# Patient Record
Sex: FEMALE | Race: WHITE | Employment: PART TIME | URBAN - METROPOLITAN AREA
[De-identification: names, ages, dates, MRNs, and addresses within clinical notes are randomized per-mention and may not be internally consistent; named-entity substitution may affect disease eponyms.]

---

## 2017-02-28 DIAGNOSIS — F41.9 ANXIETY: ICD-10-CM

## 2017-02-28 RX ORDER — ALPRAZOLAM 0.5 MG/1
TABLET ORAL
Qty: 30 TABLET | Refills: 1 | OUTPATIENT
Start: 2017-02-28

## 2017-03-06 ENCOUNTER — OFFICE VISIT (OUTPATIENT)
Dept: FAMILY MEDICINE CLINIC | Age: 27
End: 2017-03-06

## 2017-03-06 VITALS
BODY MASS INDEX: 24.45 KG/M2 | RESPIRATION RATE: 16 BRPM | HEART RATE: 88 BPM | DIASTOLIC BLOOD PRESSURE: 64 MMHG | HEIGHT: 63 IN | SYSTOLIC BLOOD PRESSURE: 120 MMHG | WEIGHT: 138 LBS | TEMPERATURE: 98.4 F

## 2017-03-06 DIAGNOSIS — F41.9 ANXIETY: ICD-10-CM

## 2017-03-06 DIAGNOSIS — F51.01 PRIMARY INSOMNIA: ICD-10-CM

## 2017-03-06 DIAGNOSIS — J45.20 MILD INTERMITTENT ASTHMA WITHOUT COMPLICATION: ICD-10-CM

## 2017-03-06 DIAGNOSIS — H91.93 HEARING LOSS, BILATERAL: Primary | ICD-10-CM

## 2017-03-06 DIAGNOSIS — K21.00 GASTROESOPHAGEAL REFLUX DISEASE WITH ESOPHAGITIS: ICD-10-CM

## 2017-03-06 PROCEDURE — 99214 OFFICE O/P EST MOD 30 MIN: CPT | Performed by: FAMILY MEDICINE

## 2017-03-06 RX ORDER — OMEPRAZOLE 20 MG/1
CAPSULE, DELAYED RELEASE ORAL
Qty: 30 CAPSULE | Refills: 5 | Status: SHIPPED | OUTPATIENT
Start: 2017-03-06 | End: 2017-09-18 | Stop reason: SDUPTHER

## 2017-03-06 RX ORDER — ALPRAZOLAM 0.5 MG/1
TABLET ORAL
Qty: 30 TABLET | Refills: 2 | Status: SHIPPED | OUTPATIENT
Start: 2017-03-06 | End: 2017-06-02 | Stop reason: SDUPTHER

## 2017-03-06 RX ORDER — ZOLPIDEM TARTRATE 10 MG/1
TABLET ORAL
Qty: 30 TABLET | Refills: 3 | Status: SHIPPED | OUTPATIENT
Start: 2017-03-06 | End: 2017-06-02 | Stop reason: SDUPTHER

## 2017-03-06 RX ORDER — ALBUTEROL SULFATE 90 UG/1
2 AEROSOL, METERED RESPIRATORY (INHALATION) EVERY 6 HOURS PRN
Qty: 1 INHALER | Refills: 5 | Status: SHIPPED | OUTPATIENT
Start: 2017-03-06 | End: 2017-09-18 | Stop reason: SDUPTHER

## 2017-03-06 ASSESSMENT — ENCOUNTER SYMPTOMS
ABDOMINAL PAIN: 0
CONSTIPATION: 0
EYES NEGATIVE: 1
COUGH: 0
SHORTNESS OF BREATH: 0
DIARRHEA: 0
VOMITING: 0
NAUSEA: 0

## 2017-06-02 ENCOUNTER — OFFICE VISIT (OUTPATIENT)
Dept: FAMILY MEDICINE CLINIC | Age: 27
End: 2017-06-02

## 2017-06-02 VITALS
BODY MASS INDEX: 22.18 KG/M2 | TEMPERATURE: 97.5 F | DIASTOLIC BLOOD PRESSURE: 78 MMHG | SYSTOLIC BLOOD PRESSURE: 112 MMHG | RESPIRATION RATE: 16 BRPM | WEIGHT: 125.2 LBS | HEART RATE: 100 BPM | HEIGHT: 63 IN

## 2017-06-02 DIAGNOSIS — F51.01 PRIMARY INSOMNIA: ICD-10-CM

## 2017-06-02 DIAGNOSIS — F41.9 ANXIETY: Primary | ICD-10-CM

## 2017-06-02 DIAGNOSIS — M79.7 FIBROMYALGIA: ICD-10-CM

## 2017-06-02 PROCEDURE — 99213 OFFICE O/P EST LOW 20 MIN: CPT | Performed by: FAMILY MEDICINE

## 2017-06-02 RX ORDER — ALPRAZOLAM 0.5 MG/1
TABLET ORAL
Qty: 30 TABLET | Refills: 3 | Status: SHIPPED | OUTPATIENT
Start: 2017-06-02 | End: 2017-09-18 | Stop reason: SDUPTHER

## 2017-06-02 RX ORDER — ZOLPIDEM TARTRATE 10 MG/1
TABLET ORAL
Qty: 30 TABLET | Refills: 3 | Status: SHIPPED | OUTPATIENT
Start: 2017-06-02 | End: 2017-09-18 | Stop reason: SDUPTHER

## 2017-06-02 ASSESSMENT — ENCOUNTER SYMPTOMS
CONSTIPATION: 0
DIARRHEA: 0
EYES NEGATIVE: 1
NAUSEA: 0
COUGH: 0
SHORTNESS OF BREATH: 0
ABDOMINAL PAIN: 0

## 2017-06-06 ENCOUNTER — OFFICE VISIT (OUTPATIENT)
Dept: FAMILY MEDICINE CLINIC | Age: 27
End: 2017-06-06

## 2017-06-06 VITALS
SYSTOLIC BLOOD PRESSURE: 122 MMHG | TEMPERATURE: 98.7 F | HEIGHT: 63 IN | HEART RATE: 92 BPM | RESPIRATION RATE: 16 BRPM | BODY MASS INDEX: 22.08 KG/M2 | DIASTOLIC BLOOD PRESSURE: 74 MMHG | WEIGHT: 124.6 LBS

## 2017-06-06 DIAGNOSIS — K08.89 TOOTH PAIN: Primary | ICD-10-CM

## 2017-06-06 DIAGNOSIS — K04.7 TOOTH INFECTION: ICD-10-CM

## 2017-06-06 PROCEDURE — 99213 OFFICE O/P EST LOW 20 MIN: CPT | Performed by: FAMILY MEDICINE

## 2017-06-06 RX ORDER — HYDROCODONE BITARTRATE AND ACETAMINOPHEN 5; 325 MG/1; MG/1
1 TABLET ORAL EVERY 6 HOURS PRN
Qty: 12 TABLET | Refills: 0 | Status: SHIPPED | OUTPATIENT
Start: 2017-06-06 | End: 2017-06-13

## 2017-06-06 RX ORDER — AMOXICILLIN 875 MG/1
875 TABLET, COATED ORAL 2 TIMES DAILY
Qty: 20 TABLET | Refills: 0 | Status: SHIPPED | OUTPATIENT
Start: 2017-06-06 | End: 2017-09-18 | Stop reason: SDUPTHER

## 2017-06-11 ASSESSMENT — ENCOUNTER SYMPTOMS
RHINORRHEA: 0
SHORTNESS OF BREATH: 0

## 2017-09-18 ENCOUNTER — OFFICE VISIT (OUTPATIENT)
Dept: FAMILY MEDICINE CLINIC | Age: 27
End: 2017-09-18

## 2017-09-18 VITALS
BODY MASS INDEX: 21.26 KG/M2 | HEART RATE: 72 BPM | DIASTOLIC BLOOD PRESSURE: 76 MMHG | HEIGHT: 63 IN | WEIGHT: 120 LBS | RESPIRATION RATE: 16 BRPM | TEMPERATURE: 96.6 F | SYSTOLIC BLOOD PRESSURE: 112 MMHG

## 2017-09-18 DIAGNOSIS — J45.20 MILD INTERMITTENT ASTHMA WITHOUT COMPLICATION: ICD-10-CM

## 2017-09-18 DIAGNOSIS — M79.7 FIBROMYALGIA: ICD-10-CM

## 2017-09-18 DIAGNOSIS — K21.00 GASTROESOPHAGEAL REFLUX DISEASE WITH ESOPHAGITIS: ICD-10-CM

## 2017-09-18 DIAGNOSIS — K04.7 TOOTH INFECTION: ICD-10-CM

## 2017-09-18 DIAGNOSIS — K08.89 TOOTH PAIN: ICD-10-CM

## 2017-09-18 DIAGNOSIS — F51.01 PRIMARY INSOMNIA: ICD-10-CM

## 2017-09-18 DIAGNOSIS — F41.9 ANXIETY: Primary | ICD-10-CM

## 2017-09-18 PROCEDURE — 99213 OFFICE O/P EST LOW 20 MIN: CPT | Performed by: FAMILY MEDICINE

## 2017-09-18 RX ORDER — ALBUTEROL SULFATE 90 UG/1
2 AEROSOL, METERED RESPIRATORY (INHALATION) EVERY 6 HOURS PRN
Qty: 1 INHALER | Refills: 5 | Status: SHIPPED | OUTPATIENT
Start: 2017-09-18 | End: 2018-07-20

## 2017-09-18 RX ORDER — ZOLPIDEM TARTRATE 10 MG/1
TABLET ORAL
Qty: 30 TABLET | Refills: 3 | Status: SHIPPED | OUTPATIENT
Start: 2017-09-18 | End: 2018-07-20

## 2017-09-18 RX ORDER — AMOXICILLIN 875 MG/1
875 TABLET, COATED ORAL 2 TIMES DAILY
Qty: 20 TABLET | Refills: 0 | Status: SHIPPED | OUTPATIENT
Start: 2017-09-18 | End: 2017-09-28

## 2017-09-18 RX ORDER — OMEPRAZOLE 20 MG/1
CAPSULE, DELAYED RELEASE ORAL
Qty: 30 CAPSULE | Refills: 5 | Status: SHIPPED | OUTPATIENT
Start: 2017-09-18 | End: 2018-07-20

## 2017-09-18 RX ORDER — ALPRAZOLAM 0.5 MG/1
TABLET ORAL
Qty: 34 TABLET | Refills: 3 | Status: SHIPPED | OUTPATIENT
Start: 2017-09-18 | End: 2018-07-20

## 2017-09-18 ASSESSMENT — ENCOUNTER SYMPTOMS
COUGH: 0
CONSTIPATION: 0
DIARRHEA: 0
TROUBLE SWALLOWING: 0
SHORTNESS OF BREATH: 0
NAUSEA: 0
ABDOMINAL PAIN: 0
EYES NEGATIVE: 1

## 2018-07-20 ENCOUNTER — HOSPITAL ENCOUNTER (EMERGENCY)
Age: 28
Discharge: HOME | End: 2018-07-20
Attending: EMERGENCY MEDICINE

## 2018-07-20 VITALS
BODY MASS INDEX: 21.16 KG/M2 | HEIGHT: 62 IN | TEMPERATURE: 98.5 F | HEART RATE: 76 BPM | WEIGHT: 115 LBS | SYSTOLIC BLOOD PRESSURE: 122 MMHG | RESPIRATION RATE: 18 BRPM | DIASTOLIC BLOOD PRESSURE: 80 MMHG | OXYGEN SATURATION: 99 %

## 2018-07-20 DIAGNOSIS — K08.89 PAIN, DENTAL: ICD-10-CM

## 2018-07-20 DIAGNOSIS — H92.03 OTALGIA OF BOTH EARS: ICD-10-CM

## 2018-07-20 DIAGNOSIS — R06.2 WHEEZING: Primary | ICD-10-CM

## 2018-07-20 PROCEDURE — 6370000000 HC RX 637 (ALT 250 FOR IP): Performed by: EMERGENCY MEDICINE

## 2018-07-20 PROCEDURE — 6360000002 HC RX W HCPCS: Performed by: EMERGENCY MEDICINE

## 2018-07-20 PROCEDURE — 94640 AIRWAY INHALATION TREATMENT: CPT

## 2018-07-20 PROCEDURE — 99283 EMERGENCY DEPT VISIT LOW MDM: CPT

## 2018-07-20 RX ORDER — HYDROCODONE BITARTRATE AND ACETAMINOPHEN 5; 325 MG/1; MG/1
1 TABLET ORAL EVERY 6 HOURS PRN
Qty: 10 TABLET | Refills: 0 | Status: SHIPPED | OUTPATIENT
Start: 2018-07-20 | End: 2018-07-23

## 2018-07-20 RX ORDER — IBUPROFEN 200 MG
800 TABLET ORAL EVERY 6 HOURS PRN
COMMUNITY
End: 2019-09-10

## 2018-07-20 RX ORDER — AZITHROMYCIN 250 MG/1
TABLET, FILM COATED ORAL
Qty: 6 TABLET | Refills: 0 | Status: SHIPPED | OUTPATIENT
Start: 2018-07-20 | End: 2018-07-30

## 2018-07-20 RX ORDER — IPRATROPIUM BROMIDE AND ALBUTEROL SULFATE 2.5; .5 MG/3ML; MG/3ML
1 SOLUTION RESPIRATORY (INHALATION)
Status: DISCONTINUED | OUTPATIENT
Start: 2018-07-20 | End: 2018-07-20 | Stop reason: HOSPADM

## 2018-07-20 RX ORDER — DEXAMETHASONE 4 MG/1
8 TABLET ORAL ONCE
Status: COMPLETED | OUTPATIENT
Start: 2018-07-20 | End: 2018-07-20

## 2018-07-20 RX ADMIN — IPRATROPIUM BROMIDE AND ALBUTEROL SULFATE 1 AMPULE: .5; 3 SOLUTION RESPIRATORY (INHALATION) at 13:02

## 2018-07-20 RX ADMIN — DEXAMETHASONE 8 MG: 4 TABLET ORAL at 13:00

## 2018-07-20 ASSESSMENT — ENCOUNTER SYMPTOMS
SHORTNESS OF BREATH: 0
SORE THROAT: 0
BLOOD IN STOOL: 0
STRIDOR: 0
EYE DISCHARGE: 0
ABDOMINAL PAIN: 0
TROUBLE SWALLOWING: 0
EYE REDNESS: 0
DIARRHEA: 0
BACK PAIN: 0
FACIAL SWELLING: 1
CHOKING: 0
SINUS PRESSURE: 0
CHEST TIGHTNESS: 0
CONSTIPATION: 0
COUGH: 1
WHEEZING: 1
VOICE CHANGE: 0
EYE PAIN: 0
VOMITING: 0

## 2018-07-20 ASSESSMENT — PAIN DESCRIPTION - ORIENTATION: ORIENTATION: RIGHT

## 2018-07-20 ASSESSMENT — PAIN SCALES - GENERAL: PAINLEVEL_OUTOF10: 9

## 2018-07-20 ASSESSMENT — PAIN DESCRIPTION - PROGRESSION: CLINICAL_PROGRESSION: NOT CHANGED

## 2018-07-20 ASSESSMENT — PAIN DESCRIPTION - FREQUENCY: FREQUENCY: CONTINUOUS

## 2018-07-20 ASSESSMENT — PAIN DESCRIPTION - LOCATION: LOCATION: EAR;MOUTH

## 2018-07-20 ASSESSMENT — PAIN DESCRIPTION - ONSET: ONSET: SUDDEN

## 2018-07-20 ASSESSMENT — PAIN DESCRIPTION - PAIN TYPE: TYPE: ACUTE PAIN

## 2018-07-20 ASSESSMENT — PAIN DESCRIPTION - DESCRIPTORS: DESCRIPTORS: STABBING;THROBBING

## 2018-07-20 NOTE — ED PROVIDER NOTES
[address] [place] [address] ED  eMERGENCY dEPARTMENT eNCOUnter      Pt Name: Ava Mas  MRN: [id number]  [place] 1990  Date of evaluation: 7/20/2018  Provider: Laron Dominguez MD    [address] [place] [address]       Chief Complaint   Patient presents with    Dental Pain     Right side of mouth/jaw    Otalgia     Stabbing pain to right ear         HISTORY OF PRESENT ILLNESS   (Location/Symptom, Timing/Onset, Context/Setting, Quality, Duration, Modifying Factors, Severity)  Note limiting factors. Ava Mas is a 28 y.o. female who presents to the emergency department Patient with a history of polymyalgia chronic pain history of anxiety and depression seen a primary care doctor's 3 months ago for dental issue but never seen a dentist patient come to this emergency because of the dental pain for the last few days time right side of the mouth as well as patient has a right ear pain no drainage no fevers chills patient has a positive for cold cough congestion patient is a smoker    HPI    Nursing Notes were reviewed. REVIEW OF SYSTEMS    (2-9 systems for level 4, 10 or more for level 5)     Review of Systems   Constitutional: Positive for chills. Negative for activity change and fever. HENT: Positive for congestion, ear pain, facial swelling and postnasal drip. Negative for drooling, mouth sores, nosebleeds, sinus pressure, sore throat, trouble swallowing and voice change. Eyes: Negative for pain, discharge, redness and visual disturbance. Respiratory: Positive for cough and wheezing. Negative for choking, chest tightness, shortness of breath and stridor. Cardiovascular: Negative for chest pain, palpitations and leg swelling. Gastrointestinal: Negative for abdominal pain, blood in stool, constipation, diarrhea and vomiting. Endocrine: Negative for cold intolerance, polyphagia and polyuria. Genitourinary: Negative for dysuria, flank pain, frequency, genital sores and urgency.    Musculoskeletal: Negative for She is oriented to person, place, and time. She appears well-developed. Active alert cooperative pleasant sounds slightly congested nasally   HENT:   Head: Normocephalic and atraumatic. Both tms  are normal no drainage from the ears, attention given to the mother patient is a poor dental hygiene and dental decay dental caries noted patient has a gingivitis minimal erythema the premolar tooth with right-sided patient has no facial cellulitis   Eyes: Conjunctivae are normal. Right eye exhibits no discharge. Left eye exhibits no discharge. Neck: Normal range of motion. Neck supple. No JVD present. No tracheal deviation present. No thyromegaly present. Cardiovascular: Normal rate, regular rhythm and normal heart sounds. Exam reveals no gallop and no friction rub. No murmur heard. Pulmonary/Chest: No respiratory distress. She has wheezes. Abdominal: Soft. Bowel sounds are normal. She exhibits no mass. There is no rebound. Musculoskeletal: Normal range of motion. She exhibits no edema or tenderness. Lymphadenopathy:     She has no cervical adenopathy. Neurological: She is alert and oriented to person, place, and time. No cranial nerve deficit. She exhibits normal muscle tone. Skin: Skin is warm. No rash noted. No erythema.    Psychiatric: Her behavior is normal. Thought content normal.       DIAGNOSTIC RESULTS     EKG: All EKG's are interpreted by the Emergency Department Physician who either signs or Co-signs this chart in the absence of a cardiologist.        RADIOLOGY:   Non-plain film images such as CT, Ultrasound and MRI are read by the radiologist. Plain radiographic images are visualized and preliminarily interpreted by the emergency physician with the below findings:        Interpretation per the Radiologist below, if available at the time of this note:    No orders to display         ED BEDSIDE ULTRASOUND:   Performed by ED Physician - none    LABS:  Labs Reviewed - No data to display    All

## 2018-10-02 ENCOUNTER — HOSPITAL ENCOUNTER (EMERGENCY)
Age: 28
Discharge: HOME | End: 2018-10-02
Attending: INTERNAL MEDICINE

## 2018-10-02 ENCOUNTER — APPOINTMENT (OUTPATIENT)
Dept: GENERAL RADIOLOGY | Age: 28
End: 2018-10-02

## 2018-10-02 VITALS
DIASTOLIC BLOOD PRESSURE: 71 MMHG | HEART RATE: 78 BPM | OXYGEN SATURATION: 97 % | HEIGHT: 63 IN | WEIGHT: 115 LBS | TEMPERATURE: 99 F | RESPIRATION RATE: 16 BRPM | BODY MASS INDEX: 20.38 KG/M2 | SYSTOLIC BLOOD PRESSURE: 105 MMHG

## 2018-10-02 DIAGNOSIS — S60.221A CONTUSION OF RIGHT HAND, INITIAL ENCOUNTER: Primary | ICD-10-CM

## 2018-10-02 PROCEDURE — 6370000000 HC RX 637 (ALT 250 FOR IP): Performed by: NURSE PRACTITIONER

## 2018-10-02 PROCEDURE — 73130 X-RAY EXAM OF HAND: CPT

## 2018-10-02 PROCEDURE — 29125 APPL SHORT ARM SPLINT STATIC: CPT

## 2018-10-02 PROCEDURE — 99283 EMERGENCY DEPT VISIT LOW MDM: CPT

## 2018-10-02 RX ORDER — IBUPROFEN 600 MG/1
600 TABLET ORAL ONCE
Status: COMPLETED | OUTPATIENT
Start: 2018-10-02 | End: 2018-10-02

## 2018-10-02 RX ORDER — IBUPROFEN 600 MG/1
600 TABLET ORAL EVERY 6 HOURS PRN
Qty: 20 TABLET | Refills: 0 | Status: SHIPPED | OUTPATIENT
Start: 2018-10-02 | End: 2019-09-10

## 2018-10-02 RX ADMIN — IBUPROFEN 600 MG: 600 TABLET ORAL at 21:11

## 2018-10-02 ASSESSMENT — PAIN DESCRIPTION - LOCATION
LOCATION: HAND
LOCATION: HAND

## 2018-10-02 ASSESSMENT — ENCOUNTER SYMPTOMS
DIARRHEA: 0
VOICE CHANGE: 0
BACK PAIN: 0
COUGH: 0
TROUBLE SWALLOWING: 0
COLOR CHANGE: 0
VOMITING: 0
NAUSEA: 0
SHORTNESS OF BREATH: 0
CONSTIPATION: 0
SORE THROAT: 0
ABDOMINAL PAIN: 0

## 2018-10-02 ASSESSMENT — PAIN DESCRIPTION - DESCRIPTORS: DESCRIPTORS: SHARP

## 2018-10-02 ASSESSMENT — PAIN SCALES - GENERAL
PAINLEVEL_OUTOF10: 5
PAINLEVEL_OUTOF10: 8
PAINLEVEL_OUTOF10: 6

## 2018-10-02 ASSESSMENT — PAIN DESCRIPTION - PROGRESSION: CLINICAL_PROGRESSION: NOT CHANGED

## 2018-10-02 ASSESSMENT — PAIN DESCRIPTION - PAIN TYPE
TYPE: ACUTE PAIN
TYPE: ACUTE PAIN

## 2018-10-02 ASSESSMENT — PAIN DESCRIPTION - ONSET
ONSET: ON-GOING
ONSET: SUDDEN

## 2018-10-02 ASSESSMENT — PAIN DESCRIPTION - ORIENTATION
ORIENTATION: RIGHT
ORIENTATION: RIGHT

## 2018-10-02 ASSESSMENT — PAIN DESCRIPTION - FREQUENCY
FREQUENCY: CONTINUOUS
FREQUENCY: CONTINUOUS

## 2018-10-02 ASSESSMENT — PAIN - FUNCTIONAL ASSESSMENT: PAIN_FUNCTIONAL_ASSESSMENT: 0-10

## 2018-10-03 NOTE — ED NOTES
Pt discharged to home via amb accompanied by  with all documented belongings. Pt was stable at time of discharge with no questions or concerns. All prescriptions were sent with patient.  Electronically signed by Edin Krishnamurthy RN on 10/2/2018 at 9:17 PM        Edin Krishnamurthy, [address] [place] [address]  10/02/18 5339

## 2018-10-03 NOTE — ED PROVIDER NOTES
negative. Except as noted above the remainder of the review of systems was reviewed and negative. PAST MEDICAL HISTORY     Past Medical History:   Diagnosis Date    Anxiety     Asthma        SURGICAL HISTORY     History reviewed. No pertinent surgical history. CURRENT MEDICATIONS       Discharge Medication List as of 10/2/2018  9:06 PM      CONTINUE these medications which have NOT CHANGED    Details   !! ibuprofen (ADVIL;MOTRIN) 200 MG tablet Take 800 mg by mouth every 6 hours as needed for PainHistorical Med      loratadine-pseudoephedrine (CLARITIN-D 12HR) 5-120 MG per extended release tablet Take 1 tablet by mouth 2 times daily, Disp-20 tablet, R-0Print       !! - Potential duplicate medications found. Please discuss with provider. ALLERGIES     Zoloft    FAMILY HISTORY       Family History   Problem Relation Age of Onset    Asthma Mother     Heart Disease Father     Zahraa Umana / Stillbirths Sister     High Blood Pressure Sister     High Cholesterol Sister           SOCIAL HISTORY       Social History     Social History    Marital status: Single     Spouse name: N/A    Number of children: 0    Years of education: N/A     Occupational History     Other     Social History Main Topics    Smoking status: Former Smoker     Packs/day: 1.00     Years: 15.00     Types: Cigarettes     Start date: 1/1/2001     Quit date: 1/20/2016    Smokeless tobacco: Never Used    Alcohol use No    Drug use: No    Sexual activity: Not Asked     Other Topics Concern    None     Social History Narrative    None       SCREENINGS           PHYSICAL EXAM    (up to 7 for level 4, 8 or more for level 5)     ED Triage Vitals [10/02/18 1838]   BP Temp Temp Source Pulse Resp SpO2 Height Weight   (!) 116/55 98.4 °F (36.9 °C) Oral 80 16 99 % 5' 3\" (1.6 m) 115 lb (52.2 kg)       Physical Exam   Constitutional: She is oriented to person, place, and time. She appears well-developed and well-nourished.  No distress. HENT:   Head: Normocephalic and atraumatic. Eyes: Conjunctivae are normal. Right eye exhibits no discharge. Left eye exhibits no discharge. Neck: Normal range of motion. Neck supple. Cardiovascular: Normal rate and regular rhythm. Pulmonary/Chest: Effort normal and breath sounds normal.   Abdominal: Soft. Bowel sounds are normal.   Musculoskeletal: Normal range of motion. Hands:  Neurological: She is alert and oriented to person, place, and time. Skin: Skin is warm and dry. Psychiatric: She has a normal mood and affect. Nursing note and vitals reviewed. DIAGNOSTIC RESULTS     EKG: All EKG's are interpreted by the Emergency Department Physician who either signs or Co-signs this chart in the absence of a cardiologist.      RADIOLOGY:   Non-plain film images such as CT, Ultrasound and MRI are read by the radiologist. Plain radiographic images are visualized and preliminarily interpreted by the emergency physician with the below findings:    Negative for acute fracture or dislocation. Interpretation per the Radiologist below (if available at the time of note entry):    XR HAND RIGHT (MIN 3 VIEWS)    (Results Pending)       LABS:  Labs Reviewed - No data to display    All other labs were within normal range or not returned as of this dictation. EMERGENCY DEPARTMENT COURSE and DIFFERENTIAL DIAGNOSIS/MDM:   Vitals:    Vitals:    10/02/18 1838 10/02/18 2051   BP: (!) 116/55 105/71   Pulse: 80 78   Resp: 16 16   Temp: 98.4 °F (36.9 °C) 99 °F (37.2 °C)   TempSrc: Oral Oral   SpO2: 99% 97%   Weight: 115 lb (52.2 kg)    Height: 5' 3\" (1.6 m)        MDM    Patient presents to the ER with the above noted complaint. She is non-toxic and vital signs are stable. Her xrays are negative for acute fracture or dislocation. She was provided with  Motrin for pain while in the ER noting minimal pain relief. I will discharge her home in stable condition with a prescription for motrin.  Her right

## 2019-03-18 ENCOUNTER — TELEPHONE (OUTPATIENT)
Dept: ADMINISTRATIVE | Age: 29
End: 2019-03-18

## 2019-04-22 ENCOUNTER — TELEPHONE (OUTPATIENT)
Dept: ADMINISTRATIVE | Age: 29
End: 2019-04-22

## 2019-04-22 NOTE — TELEPHONE ENCOUNTER
[id number] still the same- no Dr, no insurance. Assured male in home, we are still concerned about her medical needs and she can call any [facility] facility when the situation turns around for her or has a need.

## 2019-09-10 ENCOUNTER — HOSPITAL ENCOUNTER (EMERGENCY)
Age: 29
Discharge: HOME | End: 2019-09-10
Attending: EMERGENCY MEDICINE

## 2019-09-10 ENCOUNTER — APPOINTMENT (OUTPATIENT)
Dept: GENERAL RADIOLOGY | Age: 29
End: 2019-09-10

## 2019-09-10 VITALS
HEART RATE: 77 BPM | WEIGHT: 120 LBS | SYSTOLIC BLOOD PRESSURE: 113 MMHG | OXYGEN SATURATION: 98 % | HEIGHT: 62 IN | TEMPERATURE: 98 F | BODY MASS INDEX: 22.08 KG/M2 | DIASTOLIC BLOOD PRESSURE: 68 MMHG | RESPIRATION RATE: 18 BRPM

## 2019-09-10 DIAGNOSIS — S90.31XA CONTUSION OF RIGHT FOOT, INITIAL ENCOUNTER: Primary | ICD-10-CM

## 2019-09-10 PROCEDURE — 73630 X-RAY EXAM OF FOOT: CPT

## 2019-09-10 PROCEDURE — 99283 EMERGENCY DEPT VISIT LOW MDM: CPT

## 2019-09-10 PROCEDURE — 6370000000 HC RX 637 (ALT 250 FOR IP): Performed by: EMERGENCY MEDICINE

## 2019-09-10 RX ORDER — OXYCODONE HYDROCHLORIDE AND ACETAMINOPHEN 5; 325 MG/1; MG/1
1-2 TABLET ORAL EVERY 6 HOURS PRN
Qty: 8 TABLET | Refills: 0 | Status: SHIPPED | OUTPATIENT
Start: 2019-09-10 | End: 2019-09-13

## 2019-09-10 RX ORDER — OXYCODONE HYDROCHLORIDE AND ACETAMINOPHEN 5; 325 MG/1; MG/1
1 TABLET ORAL ONCE
Status: COMPLETED | OUTPATIENT
Start: 2019-09-10 | End: 2019-09-10

## 2019-09-10 RX ADMIN — OXYCODONE HYDROCHLORIDE AND ACETAMINOPHEN 1 TABLET: 5; 325 TABLET ORAL at 03:31

## 2019-09-10 ASSESSMENT — PAIN DESCRIPTION - FREQUENCY: FREQUENCY: CONTINUOUS

## 2019-09-10 ASSESSMENT — PAIN DESCRIPTION - PAIN TYPE: TYPE: ACUTE PAIN

## 2019-09-10 ASSESSMENT — PAIN DESCRIPTION - LOCATION: LOCATION: ANKLE

## 2019-09-10 ASSESSMENT — PAIN DESCRIPTION - DESCRIPTORS: DESCRIPTORS: ACHING;PRESSURE

## 2019-09-10 ASSESSMENT — PAIN SCALES - GENERAL: PAINLEVEL_OUTOF10: 7

## 2019-09-10 ASSESSMENT — PAIN DESCRIPTION - ORIENTATION: ORIENTATION: RIGHT

## 2019-09-10 NOTE — ED PROVIDER NOTES
[address] [place] [address] ED  eMERGENCY dEPARTMENT eNCOUnter      Pt Name: Ileana Echevarria  MRN: [id number]  [place] 1990  Date of evaluation: 9/10/2019  Provider: Dorota Landin MD    [address] [place] [address]       Chief Complaint   Patient presents with    Foot Pain         HISTORY OF PRESENT ILLNESS   (Location/Symptom, Timing/Onset,Context/Setting, Quality, Duration, Modifying Factors, Severity)  Note limiting factors. Ileana Echevarria is a 26 y.o. female who presents to the emergency department tripped 3 or 4 hours ago and impacted her right foot, volar aspect against a step. She has pain in the volar aspect of the foot. She is not diabetic but does smoke. There is no other pain or injury. There is no other blunt trauma    HPI    NursingNotes were reviewed. REVIEW OF SYSTEMS    (2-9 systems for level 4, 10 or more for level 5)     Review of Systems     Constitutional symptoms:  no Fatigue, no fever, no chills. Skin symptoms:  Negative except as documented in HPI. ENMT symptoms:  Negative except as documented in HPI. Respiratory symptoms:  Negative except as documented in HPI. Cardiovascular symptoms:  Negative except as documented in HPI. Gastrointestinal symptoms: Negative except for documented as above in the HPI   Genitourinary symptoms:  Negative except as documented in HPI. Musculoskeletal symptoms:  Negative except as documented in HPI. Foot as above  Neurologic symptoms:  Negative except as documented in HPI. Remainder of 10 systems, all negative except for mentioned above      Except as noted above the remainder of the review of systems was reviewed and negative. PAST MEDICAL HISTORY     Past Medical History:   Diagnosis Date    Anxiety     Asthma          SURGICALHISTORY     History reviewed. No pertinent surgical history.       CURRENT MEDICATIONS       Previous Medications    No medications on file       ALLERGIES     Haldol [haloperidol lactate] and Zoloft    FAMILY HISTORY

## 2019-10-03 ENCOUNTER — TELEPHONE (OUTPATIENT)
Dept: ENDOCRINOLOGY | Age: 29
End: 2019-10-03

## 2022-01-01 ENCOUNTER — HOSPITAL ENCOUNTER (EMERGENCY)
Age: 32
Discharge: HOME | End: 2022-01-01
Attending: EMERGENCY MEDICINE

## 2022-01-01 VITALS
SYSTOLIC BLOOD PRESSURE: 126 MMHG | BODY MASS INDEX: 23.92 KG/M2 | HEIGHT: 62 IN | RESPIRATION RATE: 18 BRPM | OXYGEN SATURATION: 98 % | WEIGHT: 130 LBS | TEMPERATURE: 98.1 F | DIASTOLIC BLOOD PRESSURE: 90 MMHG | HEART RATE: 100 BPM

## 2022-01-01 DIAGNOSIS — K04.7 DENTAL INFECTION: Primary | ICD-10-CM

## 2022-01-01 DIAGNOSIS — F17.200 SMOKING: ICD-10-CM

## 2022-01-01 PROCEDURE — 6370000000 HC RX 637 (ALT 250 FOR IP): Performed by: EMERGENCY MEDICINE

## 2022-01-01 PROCEDURE — 99284 EMERGENCY DEPT VISIT MOD MDM: CPT

## 2022-01-01 RX ORDER — AMOXICILLIN AND CLAVULANATE POTASSIUM 875; 125 MG/1; MG/1
1 TABLET, FILM COATED ORAL 2 TIMES DAILY
COMMUNITY

## 2022-01-01 RX ORDER — NAPROXEN 500 MG/1
500 TABLET ORAL 2 TIMES DAILY PRN
Qty: 20 TABLET | Refills: 0 | Status: SHIPPED | OUTPATIENT
Start: 2022-01-01 | End: 2022-01-11

## 2022-01-01 RX ORDER — CLINDAMYCIN HYDROCHLORIDE 150 MG/1
300 CAPSULE ORAL ONCE
Status: COMPLETED | OUTPATIENT
Start: 2022-01-01 | End: 2022-01-01

## 2022-01-01 RX ORDER — HYDROCODONE BITARTRATE AND ACETAMINOPHEN 5; 325 MG/1; MG/1
1 TABLET ORAL EVERY 4 HOURS PRN
Qty: 12 TABLET | Refills: 0 | Status: SHIPPED | OUTPATIENT
Start: 2022-01-01 | End: 2022-01-04

## 2022-01-01 RX ORDER — CLINDAMYCIN HYDROCHLORIDE 300 MG/1
300 CAPSULE ORAL 4 TIMES DAILY
Qty: 28 CAPSULE | Refills: 0 | Status: SHIPPED | OUTPATIENT
Start: 2022-01-01 | End: 2022-01-08

## 2022-01-01 RX ORDER — AMOXICILLIN 250 MG/1
250 CAPSULE ORAL 3 TIMES DAILY
COMMUNITY
End: 2022-01-01 | Stop reason: ALTCHOICE

## 2022-01-01 RX ORDER — HYDROCODONE BITARTRATE AND ACETAMINOPHEN 5; 325 MG/1; MG/1
1 TABLET ORAL ONCE
Status: COMPLETED | OUTPATIENT
Start: 2022-01-01 | End: 2022-01-01

## 2022-01-01 RX ADMIN — HYDROCODONE BITARTRATE AND ACETAMINOPHEN 1 TABLET: 5; 325 TABLET ORAL at 20:38

## 2022-01-01 RX ADMIN — CLINDAMYCIN HYDROCHLORIDE 300 MG: 150 CAPSULE ORAL at 20:38

## 2022-01-01 ASSESSMENT — PAIN DESCRIPTION - LOCATION
LOCATION: TEETH
LOCATION: TEETH

## 2022-01-01 ASSESSMENT — PAIN DESCRIPTION - FREQUENCY
FREQUENCY: CONTINUOUS
FREQUENCY: CONTINUOUS

## 2022-01-01 ASSESSMENT — PAIN DESCRIPTION - ONSET
ONSET: ON-GOING
ONSET: ON-GOING

## 2022-01-01 ASSESSMENT — PAIN DESCRIPTION - PAIN TYPE
TYPE: ACUTE PAIN
TYPE: ACUTE PAIN

## 2022-01-01 ASSESSMENT — PAIN SCALES - GENERAL
PAINLEVEL_OUTOF10: 8
PAINLEVEL_OUTOF10: 5
PAINLEVEL_OUTOF10: 9

## 2022-01-01 ASSESSMENT — PAIN DESCRIPTION - DESCRIPTORS
DESCRIPTORS: SHARP;SHOOTING
DESCRIPTORS: SHARP

## 2022-01-01 ASSESSMENT — PAIN - FUNCTIONAL ASSESSMENT: PAIN_FUNCTIONAL_ASSESSMENT: 0-10

## 2022-01-01 ASSESSMENT — PAIN DESCRIPTION - PROGRESSION
CLINICAL_PROGRESSION: GRADUALLY WORSENING
CLINICAL_PROGRESSION: GRADUALLY IMPROVING

## 2022-01-01 ASSESSMENT — PAIN DESCRIPTION - ORIENTATION: ORIENTATION: LEFT

## 2022-01-02 NOTE — ED PROVIDER NOTES
eMERGENCY dEPARTMENT eNCOUnter      [facility]    Chief Complaint   Patient presents with    Oral Swelling       HPI    Nelly Momin is a 27 y.o. female with hx of anxiety, asthma who presentsto ED from home  By private car  With complaint of left lower face swelling pain   Onset few days   Intensity of symptoms moderate   Patient was seen in the [place] clinic and was given a prescription for Augmentin. She thinks the swelling is getting worse. Patient is a smoker. Patient was understanding her pain. Patient will get into a dentist as soon as possible. Patient denies being pregnant. PAST MEDICAL HISTORY    Past Medical History:   Diagnosis Date    Anxiety     Asthma        SURGICAL HISTORY    History reviewed. No pertinent surgical history.     CURRENT MEDICATIONS        ALLERGIES    Allergies   Allergen Reactions    Haldol [Haloperidol Lactate] Anaphylaxis    Zoloft Anaphylaxis       FAMILY HISTORY    Family History   Problem Relation Age of Onset    Asthma Mother     Heart Disease Father     [de-identified] / Stillbirths Sister     High Blood Pressure Sister     High Cholesterol Sister        SOCIAL HISTORY    Social History     Socioeconomic History    Marital status: Single     Spouse name: None    Number of children: 0    Years of education: None    Highest education level: None   Occupational History     Employer: OTHER   Tobacco Use    Smoking status: Current Every Day Smoker     Packs/day: 0.50     Years: 15.00     Pack years: 7.50     Types: Cigarettes     Start date: 2001     Last attempt to quit: 2016     Years since quittin.9    Smokeless tobacco: Never Used   Substance and Sexual Activity    Alcohol use: No     Alcohol/week: 0.0 standard drinks    Drug use: No    Sexual activity: None   Other Topics Concern    None   Social History Narrative    None     Social Determinants of Health     Financial Resource Strain:     Difficulty of Paying Living Expenses: Not on file   Food Insecurity:     Worried About Running Out of Food in the Last Year: Not on file    Gretchen of Food in the Last Year: Not on file   Transportation Needs:     Lack of Transportation (Medical): Not on file    Lack of Transportation (Non-Medical): Not on file   Physical Activity:     Days of Exercise per Week: Not on file    Minutes of Exercise per Session: Not on file   Stress:     Feeling of Stress : Not on file   Social Connections:     Frequency of Communication with Friends and Family: Not on file    Frequency of Social Gatherings with Friends and Family: Not on file    Attends [employer] Services: Not on file    Active Member of Clubs or Organizations: Not on file    Attends Club or Organization Meetings: Not on file    Marital Status: Not on file   Intimate Partner Violence:     Fear of Current or Ex-Partner: Not on file    Emotionally Abused: Not on file    Physically Abused: Not on file    Sexually Abused: Not on file   Housing Stability:     Unable to Pay for Housing in the Last Year: Not on file    Number of [place] in the Last Year: Not on file    Unstable Housing in the Last Year: Not on file       REVIEW OF SYSTEMS    Constitutional:  Denies fever, chills, weight loss or weakness   Eyes:  Denies photophobia or discharge   HENT:  Denies sore throat or ear pain but complains of dental pain and swelling  Respiratory:  Denies cough or shortness of breath   Cardiovascular:  Denies chest pain, palpitations or swelling   GI:  Denies abdominal pain, nausea, vomiting, or diarrhea   Musculoskeletal:  Denies back pain   Skin:  Denies rash   Neurologic:  Denies headache, focal weakness or sensory changes   Endocrine:  Denies polyuria or polydypsia   Lymphatic:  Denies swollen glands   Psychiatric:  Denies depression, suicidal ideation or homicidal ideation   All systems negative except as marked.      PHYSICAL EXAM    VITAL SIGNS: BP (!) 127/90   Pulse 110   Temp 98.1 °F (36.7 °C) (Oral)   Resp 18   Ht 5' 2\" (1.575 m)   Wt 130 lb (59 kg)   SpO2 93%   BMI 23.78 kg/m²    Constitutional:  Well developed, Well nourished, moderate acute distress, Non-toxic appearance. HENT:  Normocephalic, Atraumatic, poor dental hygiene, left lower dental tenderness and caries present, no abscess, bilateral external ears normal, Oropharynx moist, No oral exudates, Nose normal. Neck- Normal range of motion, No tenderness, Supple, No stridor. Eyes:  PERRL, EOMI, Conjunctiva normal, No discharge. Respiratory:  Normal breath sounds, No respiratory distress, No wheezing, No chest tenderness. Cardiovascular: Tachycardic, Normal rhythm, No murmurs, No rubs, No gallops. GI:  Bowel sounds normal, Soft, No tenderness, No masses, No pulsatile masses. : No CVA tenderness. Musculoskeletal:  Intact distal pulses, No edema, No tenderness, No cyanosis, No clubbing. Good range of motion in all major joints. No tenderness to palpation or major deformities noted. Back- No tenderness. Integument:  Warm, Dry, No erythema, No rash. Lymphatic:  No lymphadenopathy noted. Neurologic:  Alert & oriented x 3, Normal motor function, Normal sensory function, No focal deficits noted. Psychiatric:  Affect normal, Judgment normal, Mood normal.       REEVALUATION   Tolerating PO  Patient was counseled to quit smoking including medical help . The patient verbalized understanding, but is not ready to quit smoking.     Labs  Labs Reviewed - No data to display          Summation      Patient Course:     ED Medications administered this visit:    Medications   clindamycin (CLEOCIN) capsule 300 mg (has no administration in time range)   HYDROcodone-acetaminophen (NORCO) 5-325 MG per tablet 1 tablet (has no administration in time range)       New Prescriptions from this visit:    New Prescriptions    CLINDAMYCIN (CLEOCIN) 300 MG CAPSULE    Take 1 capsule by mouth 4 times daily for 7 days    HYDROCODONE-ACETAMINOPHEN ([address]) 5-325 MG PER TABLET    Take 1 tablet by mouth every 4 hours as needed for Pain for up to 3 days. Intended supply: 3 days. Take lowest dose possible to manage pain    NAPROXEN (NAPROSYN) 500 MG TABLET    Take 1 tablet by mouth 2 times daily as needed for Pain       Follow-up:  Corry Castro, [address] [place] [address] 45    Call in 1 day      your dentist    Call in 1 day          Final Impression:   1. Dental infection    2.  Smoking               (Please note that portions of this note were completed with a voice recognition program.  Efforts were made to edit the dictations but occasionally words are mis-transcribed.)          Lázaro Colón MD  01/01/22 2040

## 2024-01-07 ENCOUNTER — HOSPITAL ENCOUNTER (EMERGENCY)
Facility: HOSPITAL | Age: 34
Discharge: HOME | End: 2024-01-08

## 2024-01-07 VITALS
OXYGEN SATURATION: 97 % | DIASTOLIC BLOOD PRESSURE: 66 MMHG | WEIGHT: 130 LBS | HEART RATE: 60 BPM | HEIGHT: 62 IN | TEMPERATURE: 97.7 F | BODY MASS INDEX: 23.92 KG/M2 | RESPIRATION RATE: 16 BRPM | SYSTOLIC BLOOD PRESSURE: 127 MMHG

## 2024-01-07 DIAGNOSIS — K02.9 PAIN DUE TO DENTAL CARIES: Primary | ICD-10-CM

## 2024-01-07 PROCEDURE — 2500000004 HC RX 250 GENERAL PHARMACY W/ HCPCS (ALT 636 FOR OP/ED): Performed by: PHYSICIAN ASSISTANT

## 2024-01-07 PROCEDURE — 99283 EMERGENCY DEPT VISIT LOW MDM: CPT

## 2024-01-07 PROCEDURE — 2500000001 HC RX 250 WO HCPCS SELF ADMINISTERED DRUGS (ALT 637 FOR MEDICARE OP): Performed by: PHYSICIAN ASSISTANT

## 2024-01-07 RX ORDER — CHLORHEXIDINE GLUCONATE ORAL RINSE 1.2 MG/ML
15 SOLUTION DENTAL AS NEEDED
Qty: 473 ML | Refills: 0 | Status: SHIPPED | OUTPATIENT
Start: 2024-01-07 | End: 2024-01-21

## 2024-01-07 RX ORDER — IBUPROFEN 600 MG/1
600 TABLET ORAL ONCE
Status: COMPLETED | OUTPATIENT
Start: 2024-01-07 | End: 2024-01-07

## 2024-01-07 RX ORDER — PENICILLIN V POTASSIUM 250 MG/1
500 TABLET, FILM COATED ORAL ONCE
Status: COMPLETED | OUTPATIENT
Start: 2024-01-07 | End: 2024-01-07

## 2024-01-07 RX ORDER — OXYCODONE AND ACETAMINOPHEN 5; 325 MG/1; MG/1
1 TABLET ORAL ONCE
Status: COMPLETED | OUTPATIENT
Start: 2024-01-07 | End: 2024-01-07

## 2024-01-07 RX ORDER — NAPROXEN 500 MG/1
500 TABLET ORAL
Qty: 30 TABLET | Refills: 0 | Status: SHIPPED | OUTPATIENT
Start: 2024-01-07 | End: 2024-01-22

## 2024-01-07 RX ORDER — OXYCODONE AND ACETAMINOPHEN 5; 325 MG/1; MG/1
1 TABLET ORAL EVERY 6 HOURS PRN
Qty: 12 TABLET | Refills: 0 | Status: SHIPPED | OUTPATIENT
Start: 2024-01-07 | End: 2024-01-10

## 2024-01-07 RX ORDER — PENICILLIN V POTASSIUM 500 MG/1
500 TABLET, FILM COATED ORAL 4 TIMES DAILY
Qty: 40 TABLET | Refills: 0 | Status: SHIPPED | OUTPATIENT
Start: 2024-01-07 | End: 2024-01-17

## 2024-01-07 RX ADMIN — OXYCODONE HYDROCHLORIDE AND ACETAMINOPHEN 1 TABLET: 5; 325 TABLET ORAL at 23:13

## 2024-01-07 RX ADMIN — IBUPROFEN 600 MG: 600 TABLET, FILM COATED ORAL at 23:00

## 2024-01-07 RX ADMIN — PENICILLIN V POTASSIUM 500 MG: 250 TABLET, FILM COATED ORAL at 23:13

## 2024-01-07 ASSESSMENT — LIFESTYLE VARIABLES
HAVE YOU EVER FELT YOU SHOULD CUT DOWN ON YOUR DRINKING: NO
EVER HAD A DRINK FIRST THING IN THE MORNING TO STEADY YOUR NERVES TO GET RID OF A HANGOVER: NO
REASON UNABLE TO ASSESS: NO
EVER FELT BAD OR GUILTY ABOUT YOUR DRINKING: NO
HAVE PEOPLE ANNOYED YOU BY CRITICIZING YOUR DRINKING: NO

## 2024-01-07 ASSESSMENT — COLUMBIA-SUICIDE SEVERITY RATING SCALE - C-SSRS
1. IN THE PAST MONTH, HAVE YOU WISHED YOU WERE DEAD OR WISHED YOU COULD GO TO SLEEP AND NOT WAKE UP?: NO
2. HAVE YOU ACTUALLY HAD ANY THOUGHTS OF KILLING YOURSELF?: NO
6. HAVE YOU EVER DONE ANYTHING, STARTED TO DO ANYTHING, OR PREPARED TO DO ANYTHING TO END YOUR LIFE?: NO

## 2024-01-07 ASSESSMENT — PAIN - FUNCTIONAL ASSESSMENT: PAIN_FUNCTIONAL_ASSESSMENT: 0-10

## 2024-01-07 ASSESSMENT — PAIN DESCRIPTION - PAIN TYPE: TYPE: ACUTE PAIN

## 2024-01-07 ASSESSMENT — PAIN SCALES - GENERAL
PAINLEVEL_OUTOF10: 8
PAINLEVEL_OUTOF10: 8

## 2024-01-07 ASSESSMENT — PAIN DESCRIPTION - LOCATION
LOCATION: MOUTH
LOCATION: MOUTH

## 2024-01-07 ASSESSMENT — PAIN DESCRIPTION - ORIENTATION
ORIENTATION: LEFT
ORIENTATION: LEFT

## 2024-01-07 ASSESSMENT — PAIN DESCRIPTION - FREQUENCY: FREQUENCY: CONSTANT/CONTINUOUS

## 2024-01-07 NOTE — Clinical Note
Carmen Benson was seen and treated in our emergency department on 1/7/2024.  She may return to work on 01/09/2024.       If you have any questions or concerns, please don't hesitate to call.      Mariusz Camilo PA-C

## 2024-01-08 NOTE — ED PROVIDER NOTES
HPI   Chief Complaint   Patient presents with   • Dental Pain     Pt having dental pain that she believes could be some kind of infection. States this is a chronic issue for her       This is a 33-year-old female who presents emergency room with chief complaint of dental caries with odontalgia.  Patient states this is a chronic issue and she has been told in the past that she needs to see an oral surgeon.  Patient states he is taken over-the-counter analgesics with little relief of symptoms.  The pain is a constant throbbing pain sometimes sharp and stabbing worse with chewing food.  She denies any fevers, chills, diaphoresis, sore throat, dysphagia, tongue swelling, neck pain or neck stiffness.  The patient states that she is try to get a hold of an oral surgeon.      History provided by:  Patient   used: No                        [place] Coma Scale Score: 15                  Patient History   No past medical history on file.  No past surgical history on file.  No family history on file.  Social History     Tobacco Use   • Smoking status: Not on file   • Smokeless tobacco: Not on file   Substance Use Topics   • Alcohol use: Not on file   • Drug use: Not on file       Physical Exam   ED Triage Vitals [01/07/24 2241]   Temp Heart Rate Resp BP   36.5 °C (97.7 °F) 98 18 156/77      SpO2 Temp src Heart Rate Source Patient Position   98 % -- Monitor --      BP Location FiO2 (%)     -- --       Physical Exam  Vitals and nursing note reviewed.   Constitutional:       General: She is not in acute distress.     Appearance: Normal appearance. She is normal weight. She is not ill-appearing or toxic-appearing.   HENT:      Head: Normocephalic and atraumatic.      Right Ear: Hearing, tympanic membrane, ear canal and external ear normal.      Left Ear: Hearing, tympanic membrane, ear canal and external ear normal.      Nose: Nose normal.      Mouth/Throat:      Mouth: Mucous membranes are moist. No oral  lesions.      Dentition: Dental caries present.      Pharynx: Oropharynx is clear. Uvula midline. No posterior oropharyngeal erythema.        Comments: Very poor dentition, multiple missing teeth due to dental caries, there is no evidence of a dental abscess, Momo angina or acute necrotizing ulcer gingivitis.  Most of the teeth on the bottom are missing, tooth #20 is eroded down to the gumline, tongue is midline.  Eyes:      Conjunctiva/sclera: Conjunctivae normal.      Pupils: Pupils are equal, round, and reactive to light.   Cardiovascular:      Rate and Rhythm: Normal rate and regular rhythm.      Pulses: Normal pulses.      Heart sounds: Normal heart sounds.   Pulmonary:      Effort: Pulmonary effort is normal.      Breath sounds: Normal breath sounds.   Musculoskeletal:         General: No tenderness. Normal range of motion.      Cervical back: Normal range of motion and neck supple.   Lymphadenopathy:      Cervical: No cervical adenopathy.   Skin:     General: Skin is warm and dry.      Capillary Refill: Capillary refill takes less than 2 seconds.   Neurological:      General: No focal deficit present.      Mental Status: She is alert and oriented to person, place, and time. Mental status is at baseline.   Psychiatric:         Mood and Affect: Mood normal.         Behavior: Behavior normal.         Thought Content: Thought content normal.         Judgment: Judgment normal.         ED Course & MDM   Diagnoses as of 01/07/24 2354   Pain due to dental caries       Medical Decision Making  Temperature is 36 5 pulse 98 respirations 18 /77 pulse ox is 90% room air  Patient was medicated with oxycodone 5-3 25, 5 5 mg of Pen-Vee K p.o. and ibuprofen 6 mg p.o.  I do not see any evidence of a dental abscess, no sign of Momo angina or acute necrotizing ulcer gingivitis.  She was discharged home with prescription for naproxen, Percocet, Pen-Vee K, Peridex oral solution and Orajel.  She will follow-up with the  [facility].  She was encouraged to return with any concerns or worsening of symptoms all questions answered prior to discharge        Procedure  Procedures     Mariusz Camilo PA-C  01/07/24 5497

## 2024-05-31 ENCOUNTER — APPOINTMENT (OUTPATIENT)
Dept: CARDIOLOGY | Facility: HOSPITAL | Age: 34
End: 2024-05-31

## 2024-05-31 ENCOUNTER — HOSPITAL ENCOUNTER (EMERGENCY)
Facility: HOSPITAL | Age: 34
Discharge: LEFT BEFORE DISCHARGE | End: 2024-05-31
Attending: STUDENT IN AN ORGANIZED HEALTH CARE EDUCATION/TRAINING PROGRAM

## 2024-05-31 VITALS
HEIGHT: 63 IN | DIASTOLIC BLOOD PRESSURE: 80 MMHG | BODY MASS INDEX: 23.74 KG/M2 | HEART RATE: 108 BPM | TEMPERATURE: 97.9 F | WEIGHT: 134 LBS | RESPIRATION RATE: 20 BRPM | OXYGEN SATURATION: 100 % | SYSTOLIC BLOOD PRESSURE: 136 MMHG

## 2024-05-31 PROCEDURE — 93005 ELECTROCARDIOGRAM TRACING: CPT

## 2024-05-31 PROCEDURE — 99281 EMR DPT VST MAYX REQ PHY/QHP: CPT

## 2024-05-31 ASSESSMENT — LIFESTYLE VARIABLES
EVER HAD A DRINK FIRST THING IN THE MORNING TO STEADY YOUR NERVES TO GET RID OF A HANGOVER: NO
EVER FELT BAD OR GUILTY ABOUT YOUR DRINKING: NO
HAVE YOU EVER FELT YOU SHOULD CUT DOWN ON YOUR DRINKING: NO
TOTAL SCORE: 0
HAVE PEOPLE ANNOYED YOU BY CRITICIZING YOUR DRINKING: NO

## 2024-05-31 ASSESSMENT — COLUMBIA-SUICIDE SEVERITY RATING SCALE - C-SSRS
1. IN THE PAST MONTH, HAVE YOU WISHED YOU WERE DEAD OR WISHED YOU COULD GO TO SLEEP AND NOT WAKE UP?: NO
6. HAVE YOU EVER DONE ANYTHING, STARTED TO DO ANYTHING, OR PREPARED TO DO ANYTHING TO END YOUR LIFE?: NO
2. HAVE YOU ACTUALLY HAD ANY THOUGHTS OF KILLING YOURSELF?: NO

## 2024-05-31 ASSESSMENT — PAIN SCALES - GENERAL: PAINLEVEL_OUTOF10: 4

## 2024-05-31 ASSESSMENT — PAIN - FUNCTIONAL ASSESSMENT: PAIN_FUNCTIONAL_ASSESSMENT: 0-10

## 2024-06-01 LAB
ATRIAL RATE: 109 BPM
P AXIS: 61 DEGREES
P OFFSET: 212 MS
P ONSET: 162 MS
PR INTERVAL: 116 MS
Q ONSET: 220 MS
QRS COUNT: 18 BEATS
QRS DURATION: 80 MS
QT INTERVAL: 334 MS
QTC CALCULATION(BAZETT): 449 MS
QTC FREDERICIA: 407 MS
R AXIS: 69 DEGREES
T AXIS: 65 DEGREES
T OFFSET: 387 MS
VENTRICULAR RATE: 109 BPM

## 2024-06-02 ENCOUNTER — HOSPITAL ENCOUNTER (EMERGENCY)
Facility: HOSPITAL | Age: 34
Discharge: HOME | End: 2024-06-02
Attending: EMERGENCY MEDICINE

## 2024-06-02 ENCOUNTER — APPOINTMENT (OUTPATIENT)
Dept: CARDIOLOGY | Facility: HOSPITAL | Age: 34
End: 2024-06-02

## 2024-06-02 ENCOUNTER — APPOINTMENT (OUTPATIENT)
Dept: RADIOLOGY | Facility: HOSPITAL | Age: 34
End: 2024-06-02

## 2024-06-02 VITALS
WEIGHT: 130 LBS | BODY MASS INDEX: 23.92 KG/M2 | SYSTOLIC BLOOD PRESSURE: 115 MMHG | HEART RATE: 102 BPM | TEMPERATURE: 99.1 F | RESPIRATION RATE: 16 BRPM | OXYGEN SATURATION: 100 % | HEIGHT: 62 IN | DIASTOLIC BLOOD PRESSURE: 74 MMHG

## 2024-06-02 DIAGNOSIS — F41.9 ANXIETY: ICD-10-CM

## 2024-06-02 DIAGNOSIS — R07.89 CHEST DISCOMFORT: Primary | ICD-10-CM

## 2024-06-02 LAB
ALBUMIN SERPL BCP-MCNC: 4.7 G/DL (ref 3.4–5)
ALP SERPL-CCNC: 37 U/L (ref 33–110)
ALT SERPL W P-5'-P-CCNC: 14 U/L (ref 7–45)
AMPHETAMINES UR QL SCN: ABNORMAL
ANION GAP SERPL CALC-SCNC: 14 MMOL/L (ref 10–20)
APPEARANCE UR: CLEAR
AST SERPL W P-5'-P-CCNC: 16 U/L (ref 9–39)
B-HCG SERPL-ACNC: <2 MIU/ML
BARBITURATES UR QL SCN: ABNORMAL
BASOPHILS # BLD AUTO: 0.07 X10*3/UL (ref 0–0.1)
BASOPHILS NFR BLD AUTO: 1.1 %
BENZODIAZ UR QL SCN: ABNORMAL
BILIRUB SERPL-MCNC: 0.5 MG/DL (ref 0–1.2)
BILIRUB UR STRIP.AUTO-MCNC: NEGATIVE MG/DL
BNP SERPL-MCNC: 29 PG/ML (ref 0–99)
BUN SERPL-MCNC: 12 MG/DL (ref 6–23)
BZE UR QL SCN: ABNORMAL
CALCIUM SERPL-MCNC: 9.8 MG/DL (ref 8.6–10.3)
CANNABINOIDS UR QL SCN: ABNORMAL
CARDIAC TROPONIN I PNL SERPL HS: <3 NG/L (ref 0–13)
CARDIAC TROPONIN I PNL SERPL HS: <3 NG/L (ref 0–13)
CHLORIDE SERPL-SCNC: 104 MMOL/L (ref 98–107)
CO2 SERPL-SCNC: 24 MMOL/L (ref 21–32)
COLOR UR: ABNORMAL
CREAT SERPL-MCNC: 0.7 MG/DL (ref 0.5–1.05)
D DIMER PPP FEU-MCNC: <215 NG/ML FEU
EGFRCR SERPLBLD CKD-EPI 2021: >90 ML/MIN/1.73M*2
EOSINOPHIL # BLD AUTO: 0.17 X10*3/UL (ref 0–0.7)
EOSINOPHIL NFR BLD AUTO: 2.7 %
ERYTHROCYTE [DISTWIDTH] IN BLOOD BY AUTOMATED COUNT: 12.6 % (ref 11.5–14.5)
FENTANYL+NORFENTANYL UR QL SCN: ABNORMAL
GLUCOSE SERPL-MCNC: 112 MG/DL (ref 74–99)
GLUCOSE UR STRIP.AUTO-MCNC: NEGATIVE MG/DL
HCT VFR BLD AUTO: 38.2 % (ref 36–46)
HGB BLD-MCNC: 13.1 G/DL (ref 12–16)
HOLD SPECIMEN: NORMAL
IMM GRANULOCYTES # BLD AUTO: 0.01 X10*3/UL (ref 0–0.7)
IMM GRANULOCYTES NFR BLD AUTO: 0.2 % (ref 0–0.9)
INR PPP: 1.3 (ref 0.9–1.1)
KETONES UR STRIP.AUTO-MCNC: NEGATIVE MG/DL
LACTATE SERPL-SCNC: 1.8 MMOL/L (ref 0.4–2)
LEUKOCYTE ESTERASE UR QL STRIP.AUTO: NEGATIVE
LYMPHOCYTES # BLD AUTO: 1.61 X10*3/UL (ref 1.2–4.8)
LYMPHOCYTES NFR BLD AUTO: 25.7 %
MAGNESIUM SERPL-MCNC: 1.79 MG/DL (ref 1.6–2.4)
MCH RBC QN AUTO: 29.5 PG (ref 26–34)
MCHC RBC AUTO-ENTMCNC: 34.3 G/DL (ref 32–36)
MCV RBC AUTO: 86 FL (ref 80–100)
METHADONE UR QL SCN: ABNORMAL
MONOCYTES # BLD AUTO: 0.36 X10*3/UL (ref 0.1–1)
MONOCYTES NFR BLD AUTO: 5.8 %
NEUTROPHILS # BLD AUTO: 4.04 X10*3/UL (ref 1.2–7.7)
NEUTROPHILS NFR BLD AUTO: 64.5 %
NITRITE UR QL STRIP.AUTO: NEGATIVE
NRBC BLD-RTO: 0 /100 WBCS (ref 0–0)
OPIATES UR QL SCN: ABNORMAL
OXYCODONE+OXYMORPHONE UR QL SCN: ABNORMAL
PCP UR QL SCN: ABNORMAL
PH UR STRIP.AUTO: 9 [PH]
PLATELET # BLD AUTO: 300 X10*3/UL (ref 150–450)
POTASSIUM SERPL-SCNC: 3.6 MMOL/L (ref 3.5–5.3)
PROT SERPL-MCNC: 7.2 G/DL (ref 6.4–8.2)
PROT UR STRIP.AUTO-MCNC: NEGATIVE MG/DL
PROTHROMBIN TIME: 15.2 SECONDS (ref 9.8–12.8)
RBC # BLD AUTO: 4.44 X10*6/UL (ref 4–5.2)
RBC # UR STRIP.AUTO: NEGATIVE /UL
SODIUM SERPL-SCNC: 138 MMOL/L (ref 136–145)
SP GR UR STRIP.AUTO: 1
TSH SERPL-ACNC: 0.52 MIU/L (ref 0.44–3.98)
UROBILINOGEN UR STRIP.AUTO-MCNC: <2 MG/DL
WBC # BLD AUTO: 6.3 X10*3/UL (ref 4.4–11.3)

## 2024-06-02 PROCEDURE — 84484 ASSAY OF TROPONIN QUANT: CPT | Performed by: PHYSICIAN ASSISTANT

## 2024-06-02 PROCEDURE — 80349 CANNABINOIDS NATURAL: CPT | Performed by: PHYSICIAN ASSISTANT

## 2024-06-02 PROCEDURE — 80307 DRUG TEST PRSMV CHEM ANLYZR: CPT | Performed by: PHYSICIAN ASSISTANT

## 2024-06-02 PROCEDURE — 80354 DRUG SCREENING FENTANYL: CPT | Mod: ELYLAB | Performed by: PHYSICIAN ASSISTANT

## 2024-06-02 PROCEDURE — 84075 ASSAY ALKALINE PHOSPHATASE: CPT | Performed by: PHYSICIAN ASSISTANT

## 2024-06-02 PROCEDURE — 71045 X-RAY EXAM CHEST 1 VIEW: CPT

## 2024-06-02 PROCEDURE — 93005 ELECTROCARDIOGRAM TRACING: CPT

## 2024-06-02 PROCEDURE — 83880 ASSAY OF NATRIURETIC PEPTIDE: CPT | Performed by: PHYSICIAN ASSISTANT

## 2024-06-02 PROCEDURE — 96361 HYDRATE IV INFUSION ADD-ON: CPT

## 2024-06-02 PROCEDURE — 85379 FIBRIN DEGRADATION QUANT: CPT | Performed by: PHYSICIAN ASSISTANT

## 2024-06-02 PROCEDURE — 80365 DRUG SCREENING OXYCODONE: CPT | Mod: ELYLAB | Performed by: PHYSICIAN ASSISTANT

## 2024-06-02 PROCEDURE — 83605 ASSAY OF LACTIC ACID: CPT | Performed by: PHYSICIAN ASSISTANT

## 2024-06-02 PROCEDURE — 2500000001 HC RX 250 WO HCPCS SELF ADMINISTERED DRUGS (ALT 637 FOR MEDICARE OP): Performed by: PHYSICIAN ASSISTANT

## 2024-06-02 PROCEDURE — 83735 ASSAY OF MAGNESIUM: CPT | Performed by: PHYSICIAN ASSISTANT

## 2024-06-02 PROCEDURE — 2500000004 HC RX 250 GENERAL PHARMACY W/ HCPCS (ALT 636 FOR OP/ED): Performed by: PHYSICIAN ASSISTANT

## 2024-06-02 PROCEDURE — 85610 PROTHROMBIN TIME: CPT | Performed by: PHYSICIAN ASSISTANT

## 2024-06-02 PROCEDURE — 36415 COLL VENOUS BLD VENIPUNCTURE: CPT | Performed by: PHYSICIAN ASSISTANT

## 2024-06-02 PROCEDURE — 96374 THER/PROPH/DIAG INJ IV PUSH: CPT

## 2024-06-02 PROCEDURE — 81003 URINALYSIS AUTO W/O SCOPE: CPT | Performed by: PHYSICIAN ASSISTANT

## 2024-06-02 PROCEDURE — 84702 CHORIONIC GONADOTROPIN TEST: CPT | Performed by: PHYSICIAN ASSISTANT

## 2024-06-02 PROCEDURE — 99284 EMERGENCY DEPT VISIT MOD MDM: CPT

## 2024-06-02 PROCEDURE — 84443 ASSAY THYROID STIM HORMONE: CPT | Performed by: PHYSICIAN ASSISTANT

## 2024-06-02 PROCEDURE — 71045 X-RAY EXAM CHEST 1 VIEW: CPT | Performed by: RADIOLOGY

## 2024-06-02 PROCEDURE — 85025 COMPLETE CBC W/AUTO DIFF WBC: CPT | Performed by: PHYSICIAN ASSISTANT

## 2024-06-02 RX ORDER — NAPROXEN SODIUM 220 MG/1
324 TABLET, FILM COATED ORAL ONCE
Status: COMPLETED | OUTPATIENT
Start: 2024-06-02 | End: 2024-06-02

## 2024-06-02 RX ORDER — HYDROXYZINE PAMOATE 50 MG/1
50 CAPSULE ORAL 3 TIMES DAILY PRN
Qty: 30 CAPSULE | Refills: 0 | Status: SHIPPED | OUTPATIENT
Start: 2024-06-02 | End: 2024-06-12

## 2024-06-02 RX ORDER — LORAZEPAM 2 MG/ML
1 INJECTION INTRAMUSCULAR ONCE
Status: COMPLETED | OUTPATIENT
Start: 2024-06-02 | End: 2024-06-02

## 2024-06-02 RX ADMIN — ASPIRIN 324 MG: 81 TABLET, CHEWABLE ORAL at 12:12

## 2024-06-02 RX ADMIN — LORAZEPAM 1 MG: 2 INJECTION, SOLUTION INTRAMUSCULAR; INTRAVENOUS at 14:35

## 2024-06-02 RX ADMIN — SODIUM CHLORIDE 500 ML: 9 INJECTION, SOLUTION INTRAVENOUS at 12:12

## 2024-06-02 ASSESSMENT — HEART SCORE
HEART SCORE: 1
RISK FACTORS: 1-2 RISK FACTORS
ECG: NORMAL
RISK FACTORS: 1-2 RISK FACTORS
TROPONIN: LESS THAN OR EQUAL TO NORMAL LIMIT
HEART SCORE: 1
AGE: <45
ECG: NORMAL
HISTORY: SLIGHTLY SUSPICIOUS
TROPONIN: LESS THAN OR EQUAL TO NORMAL LIMIT
HISTORY: SLIGHTLY SUSPICIOUS
AGE: <45

## 2024-06-02 ASSESSMENT — PAIN DESCRIPTION - DESCRIPTORS: DESCRIPTORS: PRESSURE

## 2024-06-02 ASSESSMENT — PAIN SCALES - GENERAL: PAINLEVEL_OUTOF10: 2

## 2024-06-02 ASSESSMENT — PAIN DESCRIPTION - PAIN TYPE: TYPE: ACUTE PAIN

## 2024-06-02 ASSESSMENT — LIFESTYLE VARIABLES
HAVE YOU EVER FELT YOU SHOULD CUT DOWN ON YOUR DRINKING: NO
EVER FELT BAD OR GUILTY ABOUT YOUR DRINKING: NO
TOTAL SCORE: 0
EVER HAD A DRINK FIRST THING IN THE MORNING TO STEADY YOUR NERVES TO GET RID OF A HANGOVER: NO
HAVE PEOPLE ANNOYED YOU BY CRITICIZING YOUR DRINKING: NO

## 2024-06-02 ASSESSMENT — PAIN - FUNCTIONAL ASSESSMENT: PAIN_FUNCTIONAL_ASSESSMENT: 0-10

## 2024-06-02 NOTE — ED PROVIDER NOTES
HPI   Chief Complaint   Patient presents with    Heart Problem       A 33-year-old female patient comes into the emergency department today with complaints of palpitations, shortness of breath, chest tightness has been intermittent over the last 2 to 3 days.  States it comes and goes.  States she tries to take her inhaler which typically helps but she is not finding any relief.  States this is making her feel anxious.  She denies any medical conditions other than asthma.  She states there is extensive family history with her father with heart attack starting at the age of 30.  States that he passed away at 54.  She does describe she wore heart monitor when she was a teenager and was told that she had irregular heartbeat as well as a murmur but everything was okay.  She otherwise has no other complaints at present time.  States she is otherwise pretty healthy.  Denies any recent travel or surgeries.  Denies birth control use.  This purpose comes in the emergency department today further evaluation.                          [place] Coma Scale Score: 15   HEART Score: 1                   Patient History   No past medical history on file.  No past surgical history on file.  No family history on file.  Social History     Tobacco Use    Smoking status: Not on file    Smokeless tobacco: Not on file   Substance Use Topics    Alcohol use: Not on file    Drug use: Not on file       Physical Exam   ED Triage Vitals [06/02/24 1217]   Temperature Heart Rate Respirations BP   37.3 °C (99.1 °F) 64 18 110/68      Pulse Ox Temp Source Heart Rate Source Patient Position   99 % Temporal Monitor Lying      BP Location FiO2 (%)     Right arm --       Physical Exam  Constitutional:       General: She is in acute distress (Moderate).      Appearance: Normal appearance. She is not ill-appearing or diaphoretic.   HENT:      Head: Normocephalic and atraumatic.      Nose: Nose normal.   Eyes:      Extraocular Movements: Extraocular movements  intact.      Conjunctiva/sclera: Conjunctivae normal.      Pupils: Pupils are equal, round, and reactive to light.   Cardiovascular:      Rate and Rhythm: Normal rate and regular rhythm.   Pulmonary:      Effort: Pulmonary effort is normal. No respiratory distress.      Breath sounds: Normal breath sounds. No stridor. No wheezing.   Abdominal:      General: Abdomen is flat.      Palpations: Abdomen is soft.   Musculoskeletal:         General: Normal range of motion.      Cervical back: Normal range of motion.   Skin:     General: Skin is warm and dry.   Neurological:      General: No focal deficit present.      Mental Status: She is alert and oriented to person, place, and time. Mental status is at baseline.   Psychiatric:      Comments: Anxious         ED Course & MDM   Diagnoses as of 06/02/24 1531   Chest discomfort   Anxiety       Medical Decision Making  A 33-year-old female patient comes into the emergency department today with complaints of palpitations, shortness of breath, chest tightness has been intermittent over the last 2 to 3 days.  States it comes and goes.  States she tries to take her inhaler which typically helps but she is not finding any relief.  States this is making her feel anxious.  She denies any medical conditions other than asthma.  She states there is extensive family history with her father with heart attack starting at the age of 30.  States that he passed away at 54.  She does describe she wore heart monitor when she was a teenager and was told that she had irregular heartbeat as well as a murmur but everything was okay.  She otherwise has no other complaints at present time.  States she is otherwise pretty healthy.  Denies any recent travel or surgeries.  Denies birth control use.  This purpose comes in the emergency department today further evaluation.    EKG, chest x-ray, laboratory studies, D-dimer ordered for the patient.  Rule ACS, arrhythmias, electrolyte normalities,  leukocytosis, left shift, acute kidney injury, pulmonary emboli, pneumothorax, pulmonary congestion, pneumonia.    EKG: My EKG interpretation, 75 bpm, normal sinus rhythm with sinus arrhythmia, no ST elevations or T wave abnormalities    Patient has been extremely anxious, tearful.  IV Ativan ordered for the patient.    Delta troponins.  Will discharge patient home with close follow-up with cardiology as well as PCP.  Patient agrees with this plan expressed full verbal understanding.  Questions answered.    Will discharge patient home with Vistaril.  Discussed with patient how to do box breathing.    Historians patient    Diagnosis: Chest discomfort, anxiety      Labs Reviewed   COMPREHENSIVE METABOLIC PANEL - Abnormal       Result Value    Glucose 112 (*)     Sodium 138      Potassium 3.6      Chloride 104      Bicarbonate 24      Anion Gap 14      Urea Nitrogen 12      Creatinine 0.70      eGFR >90      Calcium 9.8      Albumin 4.7      Alkaline Phosphatase 37      Total Protein 7.2      AST 16      Bilirubin, Total 0.5      ALT 14     PROTIME-INR - Abnormal    Protime 15.2 (*)     INR 1.3 (*)    DRUG SCREEN, URINE WITH REFLEX TO CONFIRMATION - Abnormal    Amphetamine Screen, Urine Presumptive Negative      Barbiturate Screen, Urine Presumptive Negative      Benzodiazepines Screen, Urine Presumptive Negative      Cannabinoid Screen, Urine Presumptive Positive (*)     Cocaine Metabolite Screen, Urine Presumptive Negative      Fentanyl Screen, Urine Presumptive Positive (*)     Opiate Screen, Urine Presumptive Positive (*)     Oxycodone Screen, Urine Presumptive Negative      PCP Screen, Urine Presumptive Negative      Methadone Screen, Urine Presumptive Negative      Narrative:     Drug screen results are presumptive and should not be used to assess   compliance with prescribed medication. Definitive confirmatory drug testing   has been added to this sample for any positive screen result and will be  reported  separately.     Toxicology screening results are reported qualitatively. The concentration must  be greater than or equal to the cutoff to be reported as positive. The concentration   at which the screening test can detect an individual drug or metabolite varies.   The absence of expected drug(s) and/or drug metabolite(s) may indicate non-compliance,   inappropriate timing of specimen collection relative to drug administration, poor drug   absorption, diluted/adulterated urine, or limitations of testing. For medical purposes   only; not valid for forensic use.    Interpretive questions should be directed to the laboratory medical directors.   URINALYSIS WITH REFLEX CULTURE AND MICROSCOPIC - Abnormal    Color, Urine Straw      Appearance, Urine Clear      Specific Gravity, Urine 1.004 (*)     pH, Urine 9.0 (*)     Protein, Urine NEGATIVE      Glucose, Urine NEGATIVE      Blood, Urine NEGATIVE      Ketones, Urine NEGATIVE      Bilirubin, Urine NEGATIVE      Urobilinogen, Urine <2.0      Nitrite, Urine NEGATIVE      Leukocyte Esterase, Urine NEGATIVE     MAGNESIUM - Normal    Magnesium 1.79     LACTATE - Normal    Lactate 1.8      Narrative:     Venipuncture immediately after or during the administration of Metamizole may lead to falsely low results. Testing should be performed immediately  prior to Metamizole dosing.   B-TYPE NATRIURETIC PEPTIDE - Normal    BNP 29      Narrative:        <100 pg/mL - Heart failure unlikely  100-299 pg/mL - Intermediate probability of acute heart                  failure exacerbation. Correlate with clinical                  context and patient history.    >=300 pg/mL - Heart Failure likely. Correlate with clinical                  context and patient history.    BNP testing is performed using different testing methodology at [facility] than at other [facility]. Direct result comparisons should only be made within the same method.      D-DIMER, VTE EXCLUSION - Normal     D-Dimer, Quantitative VTE Exclusion <215      Narrative:     The VTE Exclusion D-Dimer assay is reported in ng/mL Fibrinogen Equivalent Units (FEU).    Per 's instructions for use, a value of less than 500 ng/mL (FEU) may help to exclude DVT or PE in outpatients when the assay is used with a clinical pretest probability assessment.([facility] must utilize and document eCalc 'Wells Score Deep Vein Thrombosis Risk' for DVT exclusion only. Emergency Department should utilize  Guidelines for Emergency Department Use of the VTE Exclusion D-Dimer and Clinical Pretest probability assessment model for DVT or PE exclusion.)   HUMAN CHORIONIC GONADOTROPIN, SERUM QUANTITATIVE - Normal    HCG, Beta-Quantitative <2      Narrative:      Total HCG measurement is performed using the [employer] [place] Access   Immunoassay which detects intact HCG and free beta HCG subunit.    This test is not indicated for use as a tumor marker.   HCG testing is performed using a different test methodology at [facility] than other [facility]. Direct result comparison   should only be made within the same method.       SERIAL TROPONIN-INITIAL - Normal    Troponin I, High Sensitivity <3      Narrative:     Less than 99th percentile of normal range cutoff-  Female and children under 18 years old <14 ng/L; Male <21 ng/L: Negative  Repeat testing should be performed if clinically indicated.     Female and children under 18 years old 14-50 ng/L; Male 21-50 ng/L:  Consistent with possible cardiac damage and possible increased clinical   risk. Serial measurements may help to assess extent of myocardial damage.     >50 ng/L: Consistent with cardiac damage, increased clinical risk and  myocardial infarction. Serial measurements may help assess extent of   myocardial damage.      NOTE: Children less than 1 year old may have higher baseline troponin   levels and results should be interpreted in conjunction with the overall    clinical context.     NOTE: Troponin I testing is performed using a different   testing methodology at [facility] than at other   [facility]. Direct result comparisons should only   be made within the same method.   SERIAL TROPONIN, 1 HOUR - Normal    Troponin I, High Sensitivity <3      Narrative:     Less than 99th percentile of normal range cutoff-  Female and children under 18 years old <14 ng/L; Male <21 ng/L: Negative  Repeat testing should be performed if clinically indicated.     Female and children under 18 years old 14-50 ng/L; Male 21-50 ng/L:  Consistent with possible cardiac damage and possible increased clinical   risk. Serial measurements may help to assess extent of myocardial damage.     >50 ng/L: Consistent with cardiac damage, increased clinical risk and  myocardial infarction. Serial measurements may help assess extent of   myocardial damage.      NOTE: Children less than 1 year old may have higher baseline troponin   levels and results should be interpreted in conjunction with the overall   clinical context.     NOTE: Troponin I testing is performed using a different   testing methodology at [facility] than at other   [facility]. Direct result comparisons should only   be made within the same method.   TSH WITH REFLEX TO FREE T4 IF ABNORMAL - Normal    Thyroid Stimulating Hormone 0.52      Narrative:     TSH testing is performed using different testing methodology at [facility] than at other [facility]. Direct result comparisons should only be made within the same method.     TROPONIN SERIES- (INITIAL, 1 HR)    Narrative:     The following orders were created for panel order Troponin I Series, High Sensitivity (0, 1 HR).  Procedure                               Abnormality         Status                     ---------                               -----------         ------                     Troponin I, High Sensiti...[[id number]]   Normal              Final result               Troponin, High Sensitivi...[[id number]]  Normal              Final result                 Please view results for these tests on the individual orders.   CBC WITH AUTO DIFFERENTIAL    WBC 6.3      nRBC 0.0      RBC 4.44      Hemoglobin 13.1      Hematocrit 38.2      MCV 86      MCH 29.5      MCHC 34.3      RDW 12.6      Platelets 300      Neutrophils % 64.5      Immature Granulocytes %, Automated 0.2      Lymphocytes % 25.7      Monocytes % 5.8      Eosinophils % 2.7      Basophils % 1.1      Neutrophils Absolute 4.04      Immature Granulocytes Absolute, Automated 0.01      Lymphocytes Absolute 1.61      Monocytes Absolute 0.36      Eosinophils Absolute 0.17      Basophils Absolute 0.07     URINALYSIS WITH REFLEX CULTURE AND MICROSCOPIC    Narrative:     The following orders were created for panel order Urinalysis with Reflex Culture and Microscopic.  Procedure                               Abnormality         Status                     ---------                               -----------         ------                     Urinalysis with Reflex C...[[id number]]  Abnormal            Final result               Extra Urine Gray Tube[[id number]]                            In process                   Please view results for these tests on the individual orders.   EXTRA URINE GRAY TUBE   THC (MARIJUANA), URINE, CONFIRMATION   OPIATE CONFIRMATION, URINE   FENTANYL CONFIRMATION, URINE   OOB INTERNAL TRACKING        XR chest 1 view   Final Result   1.  No evidence of acute cardiopulmonary process.                  MACRO:   None        Signed by: Joseph Schoenberger 6/2/2024 12:31 PM   Dictation workstation:   [id number]            Procedure  ECG 12 lead    Performed by: Saeed Echevarria PA-C  Authorized by: Saeed Echevarria PA-C    Interpretation:     Details:  My EKG interpretation  Rate:     ECG rate:  91    ECG rate assessment: normal    Rhythm:     Rhythm: sinus rhythm    ST  segments:     ST segments:  Normal  T waves:     T waves: normal         Saeed Echevarria PA-C  06/02/24 153

## 2024-06-02 NOTE — Clinical Note
Carmen Benson was seen and treated in our emergency department on 6/2/2024.  She may return to work on 06/05/2024.       If you have any questions or concerns, please don't hesitate to call.      Sarah Munoz MD

## 2024-06-04 ENCOUNTER — OFFICE VISIT (OUTPATIENT)
Dept: CARDIOLOGY | Facility: CLINIC | Age: 34
End: 2024-06-04

## 2024-06-04 VITALS
SYSTOLIC BLOOD PRESSURE: 102 MMHG | HEIGHT: 62 IN | BODY MASS INDEX: 23.65 KG/M2 | WEIGHT: 128.5 LBS | HEART RATE: 73 BPM | DIASTOLIC BLOOD PRESSURE: 64 MMHG

## 2024-06-04 DIAGNOSIS — Z76.89 ESTABLISHING CARE WITH NEW DOCTOR, ENCOUNTER FOR: ICD-10-CM

## 2024-06-04 DIAGNOSIS — R07.89 OTHER CHEST PAIN: ICD-10-CM

## 2024-06-04 DIAGNOSIS — R06.02 SHORTNESS OF BREATH: ICD-10-CM

## 2024-06-04 DIAGNOSIS — R00.0 RACING HEART BEAT: ICD-10-CM

## 2024-06-04 DIAGNOSIS — R00.2 PALPITATIONS: ICD-10-CM

## 2024-06-04 PROCEDURE — 99205 OFFICE O/P NEW HI 60 MIN: CPT | Performed by: INTERNAL MEDICINE

## 2024-06-04 PROCEDURE — 93000 ELECTROCARDIOGRAM COMPLETE: CPT | Performed by: INTERNAL MEDICINE

## 2024-06-04 RX ORDER — METOPROLOL SUCCINATE 25 MG/1
25 TABLET, EXTENDED RELEASE ORAL DAILY
Qty: 90 TABLET | Refills: 3 | Status: SHIPPED | OUTPATIENT
Start: 2024-06-04 | End: 2025-06-04

## 2024-06-04 RX ORDER — ALBUTEROL SULFATE 90 UG/1
2 AEROSOL, METERED RESPIRATORY (INHALATION) EVERY 6 HOURS PRN
COMMUNITY
Start: 2016-08-18

## 2024-06-04 NOTE — PATIENT INSTRUCTIONS
Continue same medications/treatment.  Patient educated on proper medication use.  Patient educated on risk factor modification.  Please bring any lab results from other providers/physicians to your next appointment.    Please bring all medicines, vitamins, and herbal supplements with you when you come to the office.    Prescriptions will not be filled unless you are compliant with your follow up appointments or have a follow up appointment scheduled as per instruction of your physician. Refills should be requested at the time of your visit.    Follow up after testing  ECHO and  GXT stress test to be done  START Metoprolol 25 mg daily    IJACKIE RN, AM SCRIBING FOR AND IN THE PRESENCE OF DR. SAMMI MEHTA, DO, FACC

## 2024-06-04 NOTE — PROGRESS NOTES
CARDIOLOGY NEW PATIENT OFFICE VISIT    Patient:  Carmen Benson  YOB: 1990  MRN: [id number]       Chief Complaint/Active Symptoms:       Carmen Benson is a 33 y.o. female who is being seen today at the request of ED physician for evaluation of chest pain and SOB.  Patient has no prior history except for palpitations.  Apparently in the last week or 2 she has been very short of breath.  She went to the emergency room on several occasions.  She was there on June 2.  A D-dimer was done which was negative.  Chest x-ray was negative.  EKG showed sinus rhythm.  She had no arrhythmias recorded.  Routine labs were normal.  She was discharged with an antihistamine for anxiety (Vistaril) and told to make an appointment with cardiology and family doctor.  Again patient has no cardiac history.  She says she wore a monitor when she was a teenager and may have had some irregular heartbeat.  She was under the impression she had atrial fibrillation but I reviewed all EKGs and they are all normal including 1 from several years ago.    Patient's past history is otherwise benign.  She has an inhaler and may have some form of asthma.  This is an albuterol rescue device.  She denies any major GI  renal nephrologic urologic endocrine hematologic vascular or pulmonary abnormalities otherwise.  She does have poor dentition with various dental infections in the past.    No major surgeries.    Social history includes smoking, marijuana use, claims nonintentional use of other drugs that were laced in the marijuana (her drug screen at the hospital did show fentanyl and opioids in addition to cannabinoids).    Allergies are as listed.    Family history includes heart disease in her father but details were not known.    Remaining system review is noncontributory.    Chief Complaint   Patient presents with    New Patient Visit     Due to chest pain, recent ED visit and SOB today       History of Present Illness:    HPI      Allergies:     Allergies   Allergen Reactions    Antihistamine [Diphenhydramine Hcl] Anaphylaxis    Aripiprazole Anaphylaxis    Gabapentin Anaphylaxis    Guaifenesin Anaphylaxis    Haloperidol Anaphylaxis and Swelling    Hydrocodone-Guaifenesin Anaphylaxis     Pt reports she can take hydrocodone, not guaifenesin    Lithium Anaphylaxis    Sertraline Anaphylaxis and Swelling    Tramadol Anaphylaxis     Tongue swelling    Hydroxyzine Other     Pt reports tongue swelling with this medication        Outpatient Medications:     Current Outpatient Medications   Medication Instructions    albuterol (Ventolin HFA) 90 mcg/actuation inhaler 2 puffs, inhalation, Every 6 hours PRN    benzocaine (Orajel) 10 % mucosal gel Mouth/Throat, 3 times daily PRN    hydrOXYzine pamoate (VISTARIL) 50 mg, oral, 3 times daily PRN        Past Medical History:     No past medical history on file.    Social History:     Social History     Tobacco Use    Smoking status: Former     Types: Cigarettes    Smokeless tobacco: Current    Tobacco comments:     Pt vapes   Substance Use Topics    Alcohol use: Not Currently    Drug use: Not Currently     Comment: pt reports she quit 5/2024       Family History:        Family History   Problem Relation Name Age of Onset    COPD Mother      Heart attack Father         Review of Systems:     Review of Systems   All other systems reviewed and are negative.      Objective:     Vitals:    06/04/24 1338   BP: 102/64   Pulse: 73       Vitals:    06/04/24 1338   Weight: 58.3 kg (128 lb 8 oz)       Physical Examination:   Constitutional:       Appearance: Healthy appearance. Not in distress.   Neck:      Vascular: No JVR. JVD normal.   Pulmonary:      Effort: Pulmonary effort is normal.      Breath sounds: Normal breath sounds. No wheezing. No rhonchi. No rales.   Chest:      Chest wall: Not tender to palpatation.   Cardiovascular:      PMI at left midclavicular line. Normal rate. Regular rhythm. Normal S1.  "Normal S2.       Murmurs: There is no murmur.      No gallop.  No click. No rub.   Pulses:     Intact distal pulses.   Edema:     Peripheral edema absent.   Abdominal:      General: Bowel sounds are normal.      Palpations: Abdomen is soft.      Tenderness: There is no abdominal tenderness.   Musculoskeletal: Normal range of motion.         General: No tenderness. Skin:     General: Skin is warm and dry.   Neurological:      General: No focal deficit present.      Mental Status: Alert and oriented to person, place and time.            Lab:     CBC:   Lab Results   Component Value Date    WBC 6.3 06/02/2024    RBC 4.44 06/02/2024    HGB 13.1 06/02/2024    HCT 38.2 06/02/2024     06/02/2024        CMP:    Lab Results   Component Value Date     06/02/2024    K 3.6 06/02/2024     06/02/2024    CO2 24 06/02/2024    BUN 12 06/02/2024    CREATININE 0.70 06/02/2024    GLUCOSE 112 (H) 06/02/2024    CALCIUM 9.8 06/02/2024       Magnesium:    Lab Results   Component Value Date    MG 1.79 06/02/2024       Lipid Profile:    No results found for: \"CHLPL\", \"TRIG\", \"HDL\", \"LDLCALC\", \"LDLDIRECT\"    TSH:    Lab Results   Component Value Date    TSH 0.52 06/02/2024       BNP:   Lab Results   Component Value Date    BNP 29 06/02/2024        PT/INR:    Lab Results   Component Value Date    PROTIME 15.2 (H) 06/02/2024    INR 1.3 (H) 06/02/2024       HgBA1c:    No results found for: \"HGBA1C\"    BMP:  Lab Results   Component Value Date     06/02/2024    K 3.6 06/02/2024     06/02/2024    CO2 24 06/02/2024    BUN 12 06/02/2024    CREATININE 0.70 06/02/2024       CBC:  Lab Results   Component Value Date    WBC 6.3 06/02/2024    RBC 4.44 06/02/2024    HGB 13.1 06/02/2024    HCT 38.2 06/02/2024    MCV 86 06/02/2024    MCH 29.5 06/02/2024    MCHC 34.3 06/02/2024    RDW 12.6 06/02/2024     06/02/2024       Cardiac Enzymes:    Lab Results   Component Value Date    TROPHS <3 06/02/2024    TROPHS <3 06/02/2024 "       Hepatic Function Panel:    Lab Results   Component Value Date    ALKPHOS 37 06/02/2024    ALT 14 06/02/2024    AST 16 06/02/2024    PROT 7.2 06/02/2024    BILITOT 0.5 06/02/2024         Diagnostic Studies:     ECG 12 lead    Result Date: 6/2/2024  Normal sinus rhythm with sinus arrhythmia Normal ECG When compared with ECG of 02-JUN-2024 11:52, (unconfirmed) No significant change was found    XR chest 1 view    Result Date: 6/2/2024  Interpreted By:  Schoenberger, Joseph, STUDY: XR CHEST 1 VIEW;  6/2/2024 12:19 pm   INDICATION: Signs/Symptoms:Chest Pain/chest tightness.   COMPARISON: 03/30/2018   ACCESSION NUMBER(S): [id number]   ORDERING CLINICIAN: SAMI VIEYRA   FINDINGS:         CARDIOMEDIASTINAL SILHOUETTE: Cardiomediastinal silhouette is normal in size and configuration.   LUNGS: Lungs are clear.   ABDOMEN: No remarkable upper abdominal findings.   BONES: No acute osseous changes.       1.  No evidence of acute cardiopulmonary process.       MACRO: None   Signed by: Joseph Schoenberger 6/2/2024 12:31 PM Dictation workstation:   [id number]    ECG 12 lead    Result Date: 6/2/2024  Normal sinus rhythm with sinus arrhythmia Normal ECG When compared with ECG of 31-MAY-2024 17:37, No significant change was found    Today's EKG:  Normal sinus rhythm  Normal tracing  Radiology:     No orders to display       Assessment/Plan:           Assessment:       33-year-old female extremely anxious complaining of shortness of breath for 2 weeks, has visited the emergency room's on 2 occasions with no clinical or objective findings.    Meds, examination, laboratories and testing from hospital were all reviewed in detail with the patient and her fiancé    Basic examination and evaluation were performed by me.    Complete history and physical were obtained.    Impression:  Dyspnea with no objective abnormalities (normal x-ray normal oxygen saturation normal EKG normal exam normal labs)  High degree of anxiety  Palpitations  with normal rhythm on multiple examinations  Substance abuse  Plan:   Recommendation:  Lengthy discussion with the patient and her fiancé about findings which are negative at this point  Have offered an echocardiogram and stress test just to absolutely rule out any significant structural or functional issues which they agree upon  Have strongly encourage avoiding any illicit substances  Can continue on a low-dose of beta-blocker which was given to her by a family member (25 mg of metoprolol XL)  See me after testing results  Any major condition change can return to the emergency room  No specific cardiac restrictions at this time    Discussion:  This is a 33-year-old female who presents to the office with a high degree of anxiety with hyperventilation and complaints of palpitations and tachycardia.  Examination EKGs are all normal.  Testing done at the hospital several days ago including D-dimer were also normal.  Patient has used some illicit substances recently which we encouraged discontinuation of.  We did offer testing which she is approved.  Will do a stress test on a treadmill and also an echo and then see her back.  Have advised following with a family physician as well.  Will allow for 25 of metoprolol XL daily in the evening.  Any major condition changes she has been advised to go to the emergency room.  Will follow-up upon completion of studies to review make any additional recommendations.

## 2024-06-06 LAB
6MAM UR CFM-MCNC: <25 NG/ML
CARBOXYTHC UR-MCNC: 68 NG/ML
CODEINE UR CFM-MCNC: <50 NG/ML
FENTANYL UR CFM-MCNC: 12.1 NG/ML
HYDROCODONE CTO UR CFM-MCNC: <25 NG/ML
HYDROMORPHONE UR CFM-MCNC: <25 NG/ML
MORPHINE UR CFM-MCNC: 544 NG/ML
NORFENTANYL UR CFM-MCNC: >200 NG/ML
NORHYDROCODONE UR CFM-MCNC: <25 NG/ML
NOROXYCODONE UR CFM-MCNC: <25 NG/ML
OXYCODONE UR CFM-MCNC: <25 NG/ML
OXYMORPHONE UR CFM-MCNC: <25 NG/ML

## 2024-06-09 LAB
ATRIAL RATE: 75 BPM
ATRIAL RATE: 91 BPM
P AXIS: 51 DEGREES
P AXIS: 63 DEGREES
P OFFSET: 213 MS
P OFFSET: 215 MS
P ONSET: 162 MS
P ONSET: 164 MS
PR INTERVAL: 116 MS
PR INTERVAL: 116 MS
Q ONSET: 220 MS
Q ONSET: 222 MS
QRS COUNT: 12 BEATS
QRS COUNT: 14 BEATS
QRS DURATION: 82 MS
QRS DURATION: 84 MS
QT INTERVAL: 376 MS
QT INTERVAL: 392 MS
QTC CALCULATION(BAZETT): 437 MS
QTC CALCULATION(BAZETT): 462 MS
QTC FREDERICIA: 422 MS
QTC FREDERICIA: 432 MS
R AXIS: 70 DEGREES
R AXIS: 74 DEGREES
T AXIS: 60 DEGREES
T AXIS: 61 DEGREES
T OFFSET: 408 MS
T OFFSET: 418 MS
VENTRICULAR RATE: 75 BPM
VENTRICULAR RATE: 91 BPM

## 2025-05-07 ENCOUNTER — HOSPITAL ENCOUNTER (EMERGENCY)
Facility: HOSPITAL | Age: 35
Discharge: HOME | End: 2025-05-07

## 2025-05-07 VITALS
HEIGHT: 62 IN | BODY MASS INDEX: 27.6 KG/M2 | HEART RATE: 87 BPM | WEIGHT: 150 LBS | DIASTOLIC BLOOD PRESSURE: 68 MMHG | RESPIRATION RATE: 18 BRPM | OXYGEN SATURATION: 96 % | TEMPERATURE: 97.9 F | SYSTOLIC BLOOD PRESSURE: 119 MMHG

## 2025-05-07 DIAGNOSIS — K08.89 DENTALGIA: Primary | ICD-10-CM

## 2025-05-07 PROCEDURE — 2500000005 HC RX 250 GENERAL PHARMACY W/O HCPCS: Performed by: PHYSICIAN ASSISTANT

## 2025-05-07 PROCEDURE — 2500000004 HC RX 250 GENERAL PHARMACY W/ HCPCS (ALT 636 FOR OP/ED): Mod: JW | Performed by: PHYSICIAN ASSISTANT

## 2025-05-07 PROCEDURE — 2500000001 HC RX 250 WO HCPCS SELF ADMINISTERED DRUGS (ALT 637 FOR MEDICARE OP): Performed by: PHYSICIAN ASSISTANT

## 2025-05-07 PROCEDURE — 96372 THER/PROPH/DIAG INJ SC/IM: CPT | Performed by: PHYSICIAN ASSISTANT

## 2025-05-07 PROCEDURE — 99284 EMERGENCY DEPT VISIT MOD MDM: CPT

## 2025-05-07 RX ORDER — LIDOCAINE HYDROCHLORIDE 20 MG/ML
10 SOLUTION OROPHARYNGEAL ONCE
Status: COMPLETED | OUTPATIENT
Start: 2025-05-07 | End: 2025-05-07

## 2025-05-07 RX ORDER — AMOXICILLIN AND CLAVULANATE POTASSIUM 875; 125 MG/1; MG/1
1 TABLET, FILM COATED ORAL ONCE
Status: COMPLETED | OUTPATIENT
Start: 2025-05-07 | End: 2025-05-07

## 2025-05-07 RX ORDER — ACETAMINOPHEN 325 MG/1
650 TABLET ORAL EVERY 6 HOURS PRN
Qty: 30 TABLET | Refills: 0 | Status: SHIPPED | OUTPATIENT
Start: 2025-05-07

## 2025-05-07 RX ORDER — AMOXICILLIN AND CLAVULANATE POTASSIUM 875; 125 MG/1; MG/1
1 TABLET, FILM COATED ORAL EVERY 12 HOURS
Qty: 20 TABLET | Refills: 0 | Status: SHIPPED | OUTPATIENT
Start: 2025-05-07 | End: 2025-05-17

## 2025-05-07 RX ORDER — IBUPROFEN 600 MG/1
600 TABLET ORAL EVERY 6 HOURS PRN
Qty: 30 TABLET | Refills: 0 | Status: SHIPPED | OUTPATIENT
Start: 2025-05-07

## 2025-05-07 RX ORDER — KETOROLAC TROMETHAMINE 30 MG/ML
15 INJECTION, SOLUTION INTRAMUSCULAR; INTRAVENOUS ONCE
Status: COMPLETED | OUTPATIENT
Start: 2025-05-07 | End: 2025-05-07

## 2025-05-07 RX ADMIN — AMOXICILLIN AND CLAVULANATE POTASSIUM 1 TABLET: 875; 125 TABLET, FILM COATED ORAL at 15:21

## 2025-05-07 RX ADMIN — LIDOCAINE HYDROCHLORIDE 10 ML: 20 SOLUTION ORAL at 15:20

## 2025-05-07 RX ADMIN — KETOROLAC TROMETHAMINE 15 MG: 30 INJECTION, SOLUTION INTRAMUSCULAR at 15:21

## 2025-05-07 RX ADMIN — BENZOCAINE, BUTAMBEN, AND TETRACAINE HYDROCHLORIDE 1 SPRAY: .028; .004; .004 AEROSOL, SPRAY TOPICAL at 15:21

## 2025-05-07 ASSESSMENT — PAIN SCALES - GENERAL: PAINLEVEL_OUTOF10: 8

## 2025-05-07 ASSESSMENT — PAIN DESCRIPTION - DESCRIPTORS: DESCRIPTORS: ACHING

## 2025-05-07 ASSESSMENT — LIFESTYLE VARIABLES
EVER HAD A DRINK FIRST THING IN THE MORNING TO STEADY YOUR NERVES TO GET RID OF A HANGOVER: NO
EVER FELT BAD OR GUILTY ABOUT YOUR DRINKING: NO
HAVE PEOPLE ANNOYED YOU BY CRITICIZING YOUR DRINKING: NO
HAVE YOU EVER FELT YOU SHOULD CUT DOWN ON YOUR DRINKING: NO
TOTAL SCORE: 0

## 2025-05-07 ASSESSMENT — PAIN DESCRIPTION - LOCATION
LOCATION: MOUTH
LOCATION: FACE

## 2025-05-07 ASSESSMENT — COLUMBIA-SUICIDE SEVERITY RATING SCALE - C-SSRS
2. HAVE YOU ACTUALLY HAD ANY THOUGHTS OF KILLING YOURSELF?: NO
6. HAVE YOU EVER DONE ANYTHING, STARTED TO DO ANYTHING, OR PREPARED TO DO ANYTHING TO END YOUR LIFE?: NO
1. IN THE PAST MONTH, HAVE YOU WISHED YOU WERE DEAD OR WISHED YOU COULD GO TO SLEEP AND NOT WAKE UP?: NO

## 2025-05-07 ASSESSMENT — PAIN - FUNCTIONAL ASSESSMENT
PAIN_FUNCTIONAL_ASSESSMENT: 0-10
PAIN_FUNCTIONAL_ASSESSMENT: 0-10

## 2025-05-07 NOTE — ED PROVIDER NOTES
HPI   Chief Complaint   Patient presents with    Dental Pain       A 34-year-old female patient comes in the emergency department today secondary to left lower dental pain that started couple days ago.  States she rates pain 8 out of 10 on the pain scale describes sharp and throbbing.  Denies any associated fevers or chills.  States she has poor teeth and needs to see a dentist.  Currently does not have any insurance.  For this purpose comes in the emergency department today for the evaluation.  Otherwise no complaints present time.              Patient History   Medical History[1]  Surgical History[2]  Family History[3]  Social History[4]    Physical Exam   ED Triage Vitals [05/07/25 1440]   Temperature Heart Rate Respirations BP   36.6 °C (97.9 °F) 87 18 119/68      Pulse Ox Temp Source Heart Rate Source Patient Position   96 % Temporal -- Sitting      BP Location FiO2 (%)     Right arm --       Physical Exam  Constitutional:       General: She is in acute distress.      Appearance: Normal appearance. She is not ill-appearing or diaphoretic.   HENT:      Head: Normocephalic and atraumatic.      Nose: Nose normal.      Mouth/Throat:      Comments: Dental decay within the mouth some gingival edema but no signs of drainable abscess.  More edema in the left lower posterior region  Eyes:      Extraocular Movements: Extraocular movements intact.      Conjunctiva/sclera: Conjunctivae normal.      Pupils: Pupils are equal, round, and reactive to light.   Cardiovascular:      Rate and Rhythm: Normal rate and regular rhythm.   Pulmonary:      Effort: Pulmonary effort is normal. No respiratory distress.      Breath sounds: Normal breath sounds. No stridor. No wheezing.   Musculoskeletal:         General: Normal range of motion.      Cervical back: Normal range of motion.   Skin:     General: Skin is warm and dry.   Neurological:      General: No focal deficit present.      Mental Status: She is alert and oriented to person,  place, and time. Mental status is at baseline.   Psychiatric:         Mood and Affect: Mood normal.           ED Course & MDM   Diagnoses as of 05/07/25 4452   Dentalgia                 No data recorded                                 Medical Decision Making  A 34-year-old female patient comes in the emergency department today secondary to left lower dental pain that started couple days ago.  States she rates pain 8 out of 10 on the pain scale describes sharp and throbbing.  Denies any associated fevers or chills.  States she has poor teeth and needs to see a dentist.  Currently does not have any insurance.  For this purpose comes in the emergency department today for the evaluation.  Otherwise no complaints present time.    I will give patient medications here in the emergency department as well as antibiotics medications for pain for home.  Patient agrees with this plan expressed verbal understanding.  All questions were answered.    Historian is patient    Diagnosis: Dentalgia    Labs Reviewed - No data to display     No orders to display         Procedure  Procedures       [1] No past medical history on file.  [2]   Past Surgical History:  Procedure Laterality Date    DENTAL SURGERY     [3]   Family History  Problem Relation Name Age of Onset    COPD Mother      Heart attack Father     [4]   Social History  Tobacco Use    Smoking status: Former     Types: Cigarettes    Smokeless tobacco: Current    Tobacco comments:     Pt vapes   Substance Use Topics    Alcohol use: Not Currently    Drug use: Not Currently     Comment: pt reports she quit 5/2024        Saeed Echevarria PA-C  05/07/25 [id number]

## 2025-05-07 NOTE — Clinical Note
Carmen Benson was seen and treated in our emergency department on 5/7/2025.  She may return to work on 05/09/2025.       If you have any questions or concerns, please don't hesitate to call.      Saeed Echevarria PA-C

## 2025-09-01 ENCOUNTER — HOSPITAL ENCOUNTER (EMERGENCY)
Facility: HOSPITAL | Age: 35
Discharge: HOME | End: 2025-09-01

## 2025-09-01 VITALS
SYSTOLIC BLOOD PRESSURE: 117 MMHG | HEART RATE: 84 BPM | OXYGEN SATURATION: 97 % | TEMPERATURE: 98.2 F | BODY MASS INDEX: 27.59 KG/M2 | RESPIRATION RATE: 20 BRPM | HEIGHT: 62 IN | WEIGHT: 149.91 LBS | DIASTOLIC BLOOD PRESSURE: 62 MMHG

## 2025-09-01 DIAGNOSIS — K04.7 DENTAL ABSCESS: Primary | ICD-10-CM

## 2025-09-01 PROCEDURE — 99283 EMERGENCY DEPT VISIT LOW MDM: CPT

## 2025-09-01 RX ORDER — AMOXICILLIN AND CLAVULANATE POTASSIUM 875; 125 MG/1; MG/1
1 TABLET, FILM COATED ORAL EVERY 12 HOURS
Qty: 14 TABLET | Refills: 0 | Status: SHIPPED | OUTPATIENT
Start: 2025-09-01 | End: 2025-09-08

## 2025-09-01 ASSESSMENT — PAIN DESCRIPTION - PAIN TYPE: TYPE: ACUTE PAIN

## 2025-09-01 ASSESSMENT — LIFESTYLE VARIABLES
HAVE YOU EVER FELT YOU SHOULD CUT DOWN ON YOUR DRINKING: NO
HAVE PEOPLE ANNOYED YOU BY CRITICIZING YOUR DRINKING: NO
EVER HAD A DRINK FIRST THING IN THE MORNING TO STEADY YOUR NERVES TO GET RID OF A HANGOVER: NO
EVER FELT BAD OR GUILTY ABOUT YOUR DRINKING: NO
TOTAL SCORE: 0

## 2025-09-01 ASSESSMENT — PAIN DESCRIPTION - FREQUENCY: FREQUENCY: CONSTANT/CONTINUOUS

## 2025-09-01 ASSESSMENT — PAIN DESCRIPTION - ONSET: ONSET: SUDDEN

## 2025-09-01 ASSESSMENT — PAIN - FUNCTIONAL ASSESSMENT: PAIN_FUNCTIONAL_ASSESSMENT: 0-10

## 2025-09-01 ASSESSMENT — PAIN SCALES - GENERAL: PAINLEVEL_OUTOF10: 8

## 2025-09-01 ASSESSMENT — PAIN DESCRIPTION - LOCATION: LOCATION: TEETH

## 2025-09-01 ASSESSMENT — PAIN DESCRIPTION - DESCRIPTORS: DESCRIPTORS: ACHING

## 2025-09-01 ASSESSMENT — PAIN DESCRIPTION - PROGRESSION: CLINICAL_PROGRESSION: NOT CHANGED

## 2025-09-01 ASSESSMENT — PAIN DESCRIPTION - ORIENTATION: ORIENTATION: LEFT;LOWER
